# Patient Record
Sex: FEMALE | Race: OTHER | HISPANIC OR LATINO
[De-identification: names, ages, dates, MRNs, and addresses within clinical notes are randomized per-mention and may not be internally consistent; named-entity substitution may affect disease eponyms.]

---

## 2021-03-15 ENCOUNTER — APPOINTMENT (OUTPATIENT)
Dept: GASTROENTEROLOGY | Facility: CLINIC | Age: 76
End: 2021-03-15
Payer: MEDICARE

## 2021-03-15 VITALS
WEIGHT: 209 LBS | HEIGHT: 58 IN | DIASTOLIC BLOOD PRESSURE: 54 MMHG | RESPIRATION RATE: 16 BRPM | TEMPERATURE: 98.2 F | SYSTOLIC BLOOD PRESSURE: 185 MMHG | OXYGEN SATURATION: 95 % | BODY MASS INDEX: 43.87 KG/M2 | HEART RATE: 72 BPM

## 2021-03-15 DIAGNOSIS — I10 ESSENTIAL (PRIMARY) HYPERTENSION: ICD-10-CM

## 2021-03-15 DIAGNOSIS — K64.9 UNSPECIFIED HEMORRHOIDS: ICD-10-CM

## 2021-03-15 DIAGNOSIS — K62.89 OTHER SPECIFIED DISEASES OF ANUS AND RECTUM: ICD-10-CM

## 2021-03-15 DIAGNOSIS — Z80.0 FAMILY HISTORY OF MALIGNANT NEOPLASM OF DIGESTIVE ORGANS: ICD-10-CM

## 2021-03-15 DIAGNOSIS — K59.00 CONSTIPATION, UNSPECIFIED: ICD-10-CM

## 2021-03-15 DIAGNOSIS — K60.2 ANAL FISSURE, UNSPECIFIED: ICD-10-CM

## 2021-03-15 DIAGNOSIS — Z87.19 PERSONAL HISTORY OF OTHER DISEASES OF THE DIGESTIVE SYSTEM: ICD-10-CM

## 2021-03-15 PROCEDURE — 99215 OFFICE O/P EST HI 40 MIN: CPT

## 2021-03-15 PROCEDURE — 99072 ADDL SUPL MATRL&STAF TM PHE: CPT

## 2021-03-15 RX ORDER — OMEPRAZOLE 40 MG/1
40 CAPSULE, DELAYED RELEASE ORAL
Refills: 0 | Status: ACTIVE | COMMUNITY

## 2021-03-15 RX ORDER — ROSUVASTATIN CALCIUM 10 MG/1
10 TABLET, FILM COATED ORAL
Refills: 0 | Status: ACTIVE | COMMUNITY

## 2021-03-15 RX ORDER — LOSARTAN POTASSIUM AND HYDROCHLOROTHIAZIDE 12.5; 1 MG/1; MG/1
100-12.5 TABLET ORAL
Refills: 0 | Status: ACTIVE | COMMUNITY

## 2021-03-15 NOTE — ASSESSMENT
[FreeTextEntry1] : rectal pain- will retreat with topical treatment and diltiazem. However patient has failed medical therapy until now so will; get a surgical evaluation\par Constipation- PATIENTS EXTENSIVE MEDICAL RECORD REVIEWED INCLUDING RESULTS FRO HER PREVIOUS COLONOSCOPY, EGD AND FLEXIBLE SIGMOIDOSCOPY. She has a fecal impaction and so was given instructions for a cleansing prep. Dietary and lifestyle modification discussed with the patient.

## 2021-03-15 NOTE — PHYSICAL EXAM
[General Appearance - Alert] : alert [General Appearance - In No Acute Distress] : in no acute distress [Sclera] : the sclera and conjunctiva were normal [PERRL With Normal Accommodation] : pupils were equal in size, round, and reactive to light [Extraocular Movements] : extraocular movements were intact [Outer Ear] : the ears and nose were normal in appearance [Neck Appearance] : the appearance of the neck was normal [Neck Cervical Mass (___cm)] : no neck mass was observed [Jugular Venous Distention Increased] : there was no jugular-venous distention [Thyroid Diffuse Enlargement] : the thyroid was not enlarged [Thyroid Nodule] : there were no palpable thyroid nodules [Heart Rate And Rhythm] : heart rate was normal and rhythm regular [Heart Sounds] : normal S1 and S2 [Heart Sounds Gallop] : no gallops [Murmurs] : no murmurs [Heart Sounds Pericardial Friction Rub] : no pericardial rub [Bowel Sounds] : normal bowel sounds [Abdomen Soft] : soft [Abdomen Tenderness] : non-tender [] : no hepato-splenomegaly [Abdomen Mass (___ Cm)] : no abdominal mass palpated [Normal Sphincter Tone] : normal sphincter tone [No Rectal Mass] : no rectal mass [External Hemorrhoid] : external hemorrhoids [Cervical Lymph Nodes Enlarged Posterior Bilaterally] : posterior cervical [Cervical Lymph Nodes Enlarged Anterior Bilaterally] : anterior cervical [Supraclavicular Lymph Nodes Enlarged Bilaterally] : supraclavicular [Axillary Lymph Nodes Enlarged Bilaterally] : axillary [No CVA Tenderness] : no ~M costovertebral angle tenderness [No Spinal Tenderness] : no spinal tenderness [Abnormal Walk] : normal gait [Nail Clubbing] : no clubbing  or cyanosis of the fingernails [Musculoskeletal - Swelling] : no joint swelling seen [Motor Tone] : muscle strength and tone were normal [Deep Tendon Reflexes (DTR)] : deep tendon reflexes were 2+ and symmetric [Sensation] : the sensory exam was normal to light touch and pinprick [No Focal Deficits] : no focal deficits [Oriented To Time, Place, And Person] : oriented to person, place, and time [Impaired Insight] : insight and judgment were intact [Affect] : the affect was normal [Occult Blood Positive] : stool was negative for occult blood [FreeTextEntry1] : fecal impaction

## 2021-03-15 NOTE — HISTORY OF PRESENT ILLNESS
[Heartburn] : denies heartburn [Nausea] : denies nausea [Vomiting] : denies vomiting [Diarrhea] : denies diarrhea [Constipation] : constipation worsened [Yellow Skin Or Eyes (Jaundice)] : denies jaundice [Abdominal Pain] : denies abdominal pain [Abdominal Swelling] : denies abdominal swelling [Rectal Pain] : rectal pain worsened [Wt Gain ___ Lbs] : no recent weight gain [Wt Loss ___ Lbs] : no recent weight loss [GERD] : no gastroesophageal reflux disease [Hiatus Hernia] : no hiatus hernia [Peptic Ulcer Disease] : no peptic ulcer disease [Pancreatitis] : no pancreatitis [Cholelithiasis] : cholelithiasis [Kidney Stone] : no kidney stone [Inflammatory Bowel Disease] : no inflammatory bowel disease [Irritable Bowel Syndrome] : no irritable bowel syndrome [Diverticulitis] : diverticulitis [Alcohol Abuse] : no alcohol abuse [Malignancy] : no malignancy [Abdominal Surgery] : abdominal surgery [Appendectomy] : no appendectomy [Cholecystectomy] : cholecystectomy [de-identified] : 75 year old woman complains of rectal pain. She has a history of rectal pain for several years for which she has been receiving treatment from several gastroenterologists. She was told she has hemorrhoids and an anal fissure. She has had topical therapy including Diltiazem ointment that gave her some relief. She now complains of worsening constipation and increasing rectal pain. Her most recent colonoscopy in 2017 was negative. EGD in 1/2020 was negative. Flexible sigmoidoscopy on 6/26/20 showed internal hemorrhoids and an anal fissure. She denies rectal bleeding ,melena or hematemesis.

## 2021-07-30 ENCOUNTER — APPOINTMENT (OUTPATIENT)
Dept: OBGYN | Facility: CLINIC | Age: 76
End: 2021-07-30
Payer: MEDICARE

## 2021-07-30 VITALS — WEIGHT: 214 LBS | BODY MASS INDEX: 44.73 KG/M2

## 2021-07-30 DIAGNOSIS — Z01.419 ENCOUNTER FOR GYNECOLOGICAL EXAMINATION (GENERAL) (ROUTINE) W/OUT ABNORMAL FINDINGS: ICD-10-CM

## 2021-07-30 DIAGNOSIS — E66.01 MORBID (SEVERE) OBESITY DUE TO EXCESS CALORIES: ICD-10-CM

## 2021-07-30 PROCEDURE — 99387 INIT PM E/M NEW PAT 65+ YRS: CPT

## 2021-07-30 NOTE — PHYSICAL EXAM
[Examination Of The Breasts] : a normal appearance [No Masses] : no breast masses were palpable [Labia Majora] : normal [Labia Minora] : normal [Normal] : normal [Uterine Adnexae] : normal DISPLAY PLAN FREE TEXT

## 2021-07-30 NOTE — HISTORY OF PRESENT ILLNESS
[FreeTextEntry1] : here for annual\par complains of pelvic pain mostly when she stands up and on her hips \par menopausal / denies PMB \par mammo 2021 neg per patient \par obesity \par

## 2021-08-27 ENCOUNTER — APPOINTMENT (OUTPATIENT)
Dept: OBGYN | Facility: CLINIC | Age: 76
End: 2021-08-27
Payer: MEDICARE

## 2021-08-27 VITALS — DIASTOLIC BLOOD PRESSURE: 78 MMHG | BODY MASS INDEX: 44.1 KG/M2 | WEIGHT: 211 LBS | SYSTOLIC BLOOD PRESSURE: 146 MMHG

## 2021-08-27 DIAGNOSIS — M54.9 DORSALGIA, UNSPECIFIED: ICD-10-CM

## 2021-08-27 PROCEDURE — 99213 OFFICE O/P EST LOW 20 MIN: CPT

## 2021-08-27 NOTE — PLAN
[FreeTextEntry1] : because the patient denies any PMB no further testing needed \par rec to f/u with her PCP for her lower back and pelvic pain

## 2021-08-27 NOTE — HISTORY OF PRESENT ILLNESS
[FreeTextEntry1] : patient is here for results of her pelvic sonogram\par sono shows a <2cm fibroid and a ET of 16 mm \par patient denies any PMB and complains of pelvic pain \par

## 2022-07-29 ENCOUNTER — APPOINTMENT (OUTPATIENT)
Dept: OBGYN | Facility: CLINIC | Age: 77
End: 2022-07-29

## 2022-08-15 ENCOUNTER — APPOINTMENT (OUTPATIENT)
Dept: OBGYN | Facility: CLINIC | Age: 77
End: 2022-08-15

## 2022-08-15 VITALS
BODY MASS INDEX: 44.5 KG/M2 | WEIGHT: 212 LBS | DIASTOLIC BLOOD PRESSURE: 90 MMHG | HEIGHT: 58 IN | SYSTOLIC BLOOD PRESSURE: 180 MMHG

## 2022-08-15 DIAGNOSIS — Z86.39 PERSONAL HISTORY OF OTHER ENDOCRINE, NUTRITIONAL AND METABOLIC DISEASE: ICD-10-CM

## 2022-08-15 DIAGNOSIS — R10.2 PELVIC AND PERINEAL PAIN: ICD-10-CM

## 2022-08-15 DIAGNOSIS — Z87.19 PERSONAL HISTORY OF OTHER DISEASES OF THE DIGESTIVE SYSTEM: ICD-10-CM

## 2022-08-15 DIAGNOSIS — Z78.9 OTHER SPECIFIED HEALTH STATUS: ICD-10-CM

## 2022-08-15 PROCEDURE — 99204 OFFICE O/P NEW MOD 45 MIN: CPT | Mod: 25

## 2022-08-15 PROCEDURE — 58100 BIOPSY OF UTERUS LINING: CPT

## 2022-08-15 PROCEDURE — 99214 OFFICE O/P EST MOD 30 MIN: CPT | Mod: 25

## 2022-08-15 NOTE — PROCEDURE
[Consent Obtained] : Consent obtained [Thickened Endometrium] : thickened endometrium [Betadine] : Betadine [Tenaculum] : Tenaculum [Easy Passage] : Easy passage [Moderate] : moderate [Tolerated Well] : Patient tolerated the procedure well [No Complications] : No complications

## 2022-08-22 PROBLEM — Z86.39 HISTORY OF DIABETES MELLITUS: Status: RESOLVED | Noted: 2022-08-15 | Resolved: 2022-08-22

## 2022-08-22 PROBLEM — Z78.9 SOCIAL ALCOHOL USE: Status: ACTIVE | Noted: 2022-08-15

## 2022-08-22 PROBLEM — Z87.19 HISTORY OF GASTROESOPHAGEAL REFLUX (GERD): Status: RESOLVED | Noted: 2022-08-15 | Resolved: 2022-08-22

## 2022-09-01 LAB — CYTOLOGY CVX/VAG DOC THIN PREP: ABNORMAL

## 2022-10-26 ENCOUNTER — NON-APPOINTMENT (OUTPATIENT)
Age: 77
End: 2022-10-26

## 2022-11-08 ENCOUNTER — APPOINTMENT (OUTPATIENT)
Dept: NEPHROLOGY | Facility: CLINIC | Age: 77
End: 2022-11-08

## 2023-05-01 ENCOUNTER — APPOINTMENT (OUTPATIENT)
Dept: OBGYN | Facility: CLINIC | Age: 78
End: 2023-05-01
Payer: MEDICARE

## 2023-05-01 DIAGNOSIS — R93.89 ABNORMAL FINDINGS ON DIAGNOSTIC IMAGING OF OTHER SPECIFIED BODY STRUCTURES: ICD-10-CM

## 2023-05-01 DIAGNOSIS — N84.0 POLYP OF CORPUS UTERI: ICD-10-CM

## 2023-05-01 PROCEDURE — 99213 OFFICE O/P EST LOW 20 MIN: CPT

## 2023-05-01 RX ORDER — HYDROCORTISONE ACETATE 25 MG/1
25 SUPPOSITORY RECTAL TWICE DAILY
Qty: 1 | Refills: 1 | Status: DISCONTINUED | COMMUNITY
Start: 2021-03-15 | End: 2023-05-01

## 2023-05-01 RX ORDER — ENEMA 19; 7 G/133ML; G/133ML
7-19 ENEMA RECTAL
Qty: 1 | Refills: 1 | Status: DISCONTINUED | OUTPATIENT
Start: 2021-03-15 | End: 2023-05-01

## 2023-05-01 RX ORDER — POLYETHYLENE GLYCOL 3350 17 G/17G
17 POWDER, FOR SOLUTION ORAL DAILY
Qty: 1 | Refills: 0 | Status: DISCONTINUED | OUTPATIENT
Start: 2021-03-15 | End: 2023-05-01

## 2023-09-13 ENCOUNTER — INPATIENT (INPATIENT)
Facility: HOSPITAL | Age: 78
LOS: 7 days | Discharge: HOME | DRG: 287 | End: 2023-09-21
Attending: INTERNAL MEDICINE | Admitting: INTERNAL MEDICINE
Payer: MEDICARE

## 2023-09-13 VITALS
OXYGEN SATURATION: 99 % | HEART RATE: 76 BPM | RESPIRATION RATE: 20 BRPM | DIASTOLIC BLOOD PRESSURE: 76 MMHG | SYSTOLIC BLOOD PRESSURE: 190 MMHG | TEMPERATURE: 98 F

## 2023-09-13 DIAGNOSIS — Z90.49 ACQUIRED ABSENCE OF OTHER SPECIFIED PARTS OF DIGESTIVE TRACT: Chronic | ICD-10-CM

## 2023-09-13 DIAGNOSIS — R07.9 CHEST PAIN, UNSPECIFIED: ICD-10-CM

## 2023-09-13 LAB
ALBUMIN SERPL ELPH-MCNC: 3.4 G/DL — SIGNIFICANT CHANGE UP (ref 3.3–5)
ALP SERPL-CCNC: 184 U/L — HIGH (ref 40–120)
ALT FLD-CCNC: 20 U/L — SIGNIFICANT CHANGE UP (ref 10–45)
ANION GAP SERPL CALC-SCNC: 11 MMOL/L — SIGNIFICANT CHANGE UP (ref 5–17)
AST SERPL-CCNC: 45 U/L — HIGH (ref 10–40)
BASOPHILS # BLD AUTO: 0.05 K/UL — SIGNIFICANT CHANGE UP (ref 0–0.2)
BASOPHILS NFR BLD AUTO: 0.9 % — SIGNIFICANT CHANGE UP (ref 0–2)
BILIRUB SERPL-MCNC: 0.5 MG/DL — SIGNIFICANT CHANGE UP (ref 0.2–1.2)
BUN SERPL-MCNC: 12 MG/DL — SIGNIFICANT CHANGE UP (ref 7–23)
CALCIUM SERPL-MCNC: 9.6 MG/DL — SIGNIFICANT CHANGE UP (ref 8.4–10.5)
CHLORIDE SERPL-SCNC: 107 MMOL/L — SIGNIFICANT CHANGE UP (ref 96–108)
CO2 SERPL-SCNC: 24 MMOL/L — SIGNIFICANT CHANGE UP (ref 22–31)
CREAT SERPL-MCNC: 1.04 MG/DL — SIGNIFICANT CHANGE UP (ref 0.5–1.3)
EGFR: 55 ML/MIN/1.73M2 — LOW
EOSINOPHIL # BLD AUTO: 0.18 K/UL — SIGNIFICANT CHANGE UP (ref 0–0.5)
EOSINOPHIL NFR BLD AUTO: 3.2 % — SIGNIFICANT CHANGE UP (ref 0–6)
GLUCOSE SERPL-MCNC: 98 MG/DL — SIGNIFICANT CHANGE UP (ref 70–99)
HCT VFR BLD CALC: 27.4 % — LOW (ref 34.5–45)
HGB BLD-MCNC: 8.1 G/DL — LOW (ref 11.5–15.5)
IMM GRANULOCYTES NFR BLD AUTO: 0.2 % — SIGNIFICANT CHANGE UP (ref 0–0.9)
LYMPHOCYTES # BLD AUTO: 2.45 K/UL — SIGNIFICANT CHANGE UP (ref 1–3.3)
LYMPHOCYTES # BLD AUTO: 43.8 % — SIGNIFICANT CHANGE UP (ref 13–44)
MCHC RBC-ENTMCNC: 24.9 PG — LOW (ref 27–34)
MCHC RBC-ENTMCNC: 29.6 GM/DL — LOW (ref 32–36)
MCV RBC AUTO: 84.3 FL — SIGNIFICANT CHANGE UP (ref 80–100)
MONOCYTES # BLD AUTO: 0.51 K/UL — SIGNIFICANT CHANGE UP (ref 0–0.9)
MONOCYTES NFR BLD AUTO: 9.1 % — SIGNIFICANT CHANGE UP (ref 2–14)
NEUTROPHILS # BLD AUTO: 2.39 K/UL — SIGNIFICANT CHANGE UP (ref 1.8–7.4)
NEUTROPHILS NFR BLD AUTO: 42.8 % — LOW (ref 43–77)
NRBC # BLD: 0 /100 WBCS — SIGNIFICANT CHANGE UP (ref 0–0)
NT-PROBNP SERPL-SCNC: 329 PG/ML — HIGH (ref 0–300)
PLATELET # BLD AUTO: 189 K/UL — SIGNIFICANT CHANGE UP (ref 150–400)
POTASSIUM SERPL-MCNC: 3.8 MMOL/L — SIGNIFICANT CHANGE UP (ref 3.5–5.3)
POTASSIUM SERPL-SCNC: 3.8 MMOL/L — SIGNIFICANT CHANGE UP (ref 3.5–5.3)
PROT SERPL-MCNC: 7.2 G/DL — SIGNIFICANT CHANGE UP (ref 6–8.3)
RBC # BLD: 3.25 M/UL — LOW (ref 3.8–5.2)
RBC # FLD: 17.5 % — HIGH (ref 10.3–14.5)
SODIUM SERPL-SCNC: 142 MMOL/L — SIGNIFICANT CHANGE UP (ref 135–145)
TROPONIN T, HIGH SENSITIVITY RESULT: 18 NG/L — SIGNIFICANT CHANGE UP (ref 0–51)
WBC # BLD: 5.59 K/UL — SIGNIFICANT CHANGE UP (ref 3.8–10.5)
WBC # FLD AUTO: 5.59 K/UL — SIGNIFICANT CHANGE UP (ref 3.8–10.5)

## 2023-09-13 PROCEDURE — 99285 EMERGENCY DEPT VISIT HI MDM: CPT

## 2023-09-13 PROCEDURE — 71045 X-RAY EXAM CHEST 1 VIEW: CPT | Mod: 26

## 2023-09-13 NOTE — ED PROVIDER NOTE - ATTENDING CONTRIBUTION TO CARE
Attending MD Fofana: I personally have seen and examined this patient.  Resident note reviewed and agree on plan of care and except where noted.  See below for details.     seen in [facility] 1    78F with PMH/PSH including HTN, HLD, DM, arthritis (Naproxen PRN), GERD presents to the ED with chest pressure for a week.  Reports pain started one day about a week, denies inciting event.  Reports shortness of breath with exertion this week.  Reports chest pressure is mid to L sided.  Reports has had LE edema all summer, denies worse this week.  Reports saw her doctor this AM who sent her in for eval. Denies abdominal pain, nausea, vomiting, diarrhea, bloody or black stools. Denies dysuria, hematuria, change in urinary habits including frequency, urgency. Denies recent illness, fevers, chills.  Denies URI symptoms.  Denies unilateral leg swelling, hemoptysis, recent surgery or trauma, prior PE or DVT, hormone use, history of malignancy. Reports takes Naproxen for hip pain but reports that did not improve her chest pain.  Reports also takes ASA 81mg.  Reports was given ASA x 4 at [facility] Dr. Pryor's office.    Exam:   General: NAD  HENT: head NCAT, airway patent  Eyes: anicteric, no conjunctival injection   Lungs: lungs CTAB with good inspiratory effort, no wheezing, no rhonchi, no rales  Cardiac: +S1S2, no obvious m/r/g, +pitting edema bilaterally to knees  GI: abdomen soft with +BS, NT, ND  : no CVAT  MSK: ranging neck and extremities freely  Neuro: moving all extremities spontaneously, nonfocal  Psych: normal mood and affect    A/P: 78F with chest pressure, shortness of breath, will evaluate for but not limited to ACS, will eval for new HF, will obtain labs, EKG, keep on monitor, CXR, likely admit Attending MD Fofana: I personally have seen and examined this patient.  Resident note reviewed and agree on plan of care and except where noted.  See below for details.     seen in [facility] 1    78F with PMH/PSH including HTN, HLD, DM, arthritis (Naproxen PRN), GERD presents to the ED with chest pressure for a week.  Reports pain started one day about a week, denies inciting event.  Reports shortness of breath with exertion this week.  Reports chest pressure is mid to L sided.  Reports has had LE edema all summer, denies worse this week.  Reports saw her doctor this AM who sent her in for eval. Denies abdominal pain, nausea, vomiting, diarrhea, bloody or black stools. Denies dysuria, hematuria, change in urinary habits including frequency, urgency. Denies recent illness, fevers, chills.  Denies URI symptoms.  Denies unilateral leg swelling, hemoptysis, recent surgery or trauma, prior PE or DVT, hormone use, history of malignancy. Reports takes Naproxen for hip pain but reports that did not improve her chest pain.  Reports also takes ASA 81mg.  Reports was given ASA x 4 at [facility] Dr. Pryor's office.    Exam:   General: NAD  HENT: head NCAT, airway patent  Eyes: anicteric, no conjunctival injection   Lungs: lungs CTAB with good inspiratory effort, no wheezing, no rhonchi, no rales  Cardiac: +S1S2, no obvious m/r/g, +pitting edema bilaterally to knees  GI: abdomen soft with +BS, NT, ND  : no CVAT  MSK: ranging neck and extremities freely  Neuro: moving all extremities spontaneously, nonfocal  Psych: normal mood and affect    A/P: 78F with chest pressure, shortness of breath, will evaluate for but not limited to ACS, will eval for new HF, will obtain labs, EKG, keep on monitor, CXR, likely admit Attending MD Fofana: I personally have seen and examined this patient.  Resident note reviewed and agree on plan of care and except where noted.  See below for details.     seen in [facility] 1    78F with PMH/PSH including HTN, HLD, DM, arthritis (Naproxen PRN), GERD presents to the ED with chest pressure for a week.  Reports pain started one day about a week, denies inciting event.  Reports shortness of breath with exertion this week.  Reports chest pressure is mid to L sided.  Reports has had LE edema all summer, denies worse this week.  Reports saw her doctor this AM who sent her in for eval. Denies abdominal pain, nausea, vomiting, diarrhea, bloody or black stools. Denies dysuria, hematuria, change in urinary habits including frequency, urgency. Denies recent illness, fevers, chills.  Denies URI symptoms.  Denies unilateral leg swelling, hemoptysis, recent surgery or trauma, prior PE or DVT, hormone use, history of malignancy. Reports takes Naproxen for hip pain but reports that did not improve her chest pain.  Reports also takes ASA 81mg.  Reports was given ASA x 4 at [facility] Dr. Pryor's office.    Exam:   General: NAD  HENT: head NCAT, airway patent  Eyes: anicteric, no conjunctival injection   Lungs: lungs CTAB with good inspiratory effort, no wheezing, no rhonchi, no rales  Cardiac: +S1S2, no obvious m/r/g, +pitting edema bilaterally to knees  GI: abdomen soft with +BS, NT, ND  : no CVAT  MSK: ranging neck and extremities freely  Neuro: moving all extremities spontaneously, nonfocal  Psych: normal mood and affect    A/P: 78F with chest pressure, shortness of breath, will evaluate for but not limited to ACS, will eval for new HF, will obtain labs, EKG, keep on monitor, CXR, likely admit

## 2023-09-13 NOTE — ED PROVIDER NOTE - PROGRESS NOTE DETAILS
Matt Beltran MD PGY1: Patient reassessed, stable. Offered analgesia. Deferring at this time. Got ASA x 4 prior at cardiologist.

## 2023-09-13 NOTE — ED ADULT TRIAGE NOTE - NS ED NURSE AMBULANCES
[facility], Ambulance Department [facility], Ambulance Department [facility], Ambulance Department

## 2023-09-13 NOTE — ED ADULT NURSE NOTE - NSFALLRISKINTERV_ED_ALL_ED
Communicate fall risk and risk factors to all staff, patient, and family/Provide visual cue: yellow wristband, yellow gown, etc/Reinforce activity limits and safety measures with patient and family/Call bell, personal items and telephone in reach/Instruct patient to call for assistance before getting out of bed/chair/stretcher/Non-slip footwear applied when patient is off stretcher/[place] to call system/Physically safe environment - no spills, clutter or unnecessary equipment/Purposeful Proactive Rounding/Room/bathroom lighting operational, light cord in reach Communicate fall risk and risk factors to all staff, patient, and family/Provide visual cue: yellow wristband, yellow gown, etc/Reinforce activity limits and safety measures with patient and family/Call bell, personal items and telephone in reach/Instruct patient to call for assistance before getting out of bed/chair/stretcher/Non-slip footwear applied when patient is off stretcher/[place] to call system/Physically safe environment - no spills, clutter or unnecessary equipment/Purposeful Proactive Rounding/Room/bathroom lighting operational, light cord in reach Communicate fall risk and risk factors to all staff, patient, and family/Provide visual cue: yellow wristband, yellow gown, etc/Reinforce activity limits and safety measures with patient and family/Call bell, personal items and telephone in reach/Instruct patient to call for assistance before getting out of bed/chair/stretcher/Non-slip footwear applied when patient is off stretcher/[place] to call system/Physically safe environment - no spills, clutter or unnecessary equipment/Purposeful Proactive Rounding/Room/bathroom lighting operational, light cord in reach

## 2023-09-13 NOTE — ED PROVIDER NOTE - CLINICAL SUMMARY MEDICAL DECISION MAKING FREE TEXT BOX
70-year-old female with past medical history of hypertension, diabetes, HLD presenting to the ED with 2 to 3 days of worsening substernal chest pain nonradiating with shortness of breath.  Patient concurrently endorsing progressive exertional dyspnea over the last few weeks tolerating only 10 to 15 feet before becoming winded.  Worsening bilateral peripheral edema of LE.  No vision changes or headache decreased urinary output concerning for hypertensive emergency.  Sent by cardiologist Dr. Beny Pryor given chest pain and hypertension concerning for ACS.  Low suspicion for acute aortic dissection or PE.  Neuro exam nonfocal, intact.  2+ bilateral pitting edema pitting.  Dispo pending labs and imaging, likely admission for CDU for stress/echo, possible cath.

## 2023-09-13 NOTE — ED PROVIDER NOTE - NSICDXPASTMEDICALHX_GEN_ALL_CORE_FT
PAST MEDICAL HISTORY:  DM (diabetes mellitus)     History of diverticulitis     HLD (hyperlipidemia)     HTN (hypertension)     Osteoarthritis

## 2023-09-13 NOTE — ED PROVIDER NOTE - OBJECTIVE STATEMENT
78 yof Hx of HTN on losartan, DM on metformin, HLD presenting with 2-3 days of substernal chest pain non-radiating with shortness of breath. Past few weeks has had worsening progressive exertional dyspnea, only able to tolerate 10-15 feet before becoming winded. No orthopnea. Endorsing worsening peripheral edema pitting b/l in same time frame. No vision changes or headache. No decreased urinary output or hematuria. Seen by PCP this morning who was concerned about her chest pain, blood pressure and thyroid tests? Was sent to cardiology Dr. Beny Garber who sent to ED for likely stress/echo and possible cath. No abd pain, NVD. No diaphoresis. No fevers/chills. No hemoptysis. Pain not radiating to back.     Hx per daughter in law at bedside, offered  deferred preferring to translate for provider. Affirmed at any point can utilize .

## 2023-09-13 NOTE — ED ADULT NURSE NOTE - OBJECTIVE STATEMENT
77 y/o female presents to the ED BIBEMS sent by cardiologist for unstable angina. A/Ox4. Ambulatory without assistive devices at baseline. PMH: HTN, HLD and CAD. Macedonian-speaking,  used. Patient sent by MD Pryor for "r/o acute MI, CXR and d-dimer". Patient endorses 6/10 midsternal chest pain beginning 6 days ago. As per EMS, patient was given PO nitro and 324mg of ASA at 19:30. Patient endorses relief of chest pain following nitro. Patient denies chest pain at this time, dyspnea, lightheadedness, nausea and vomiting. Patient on CM, NSR. Safety and comfort provided. 79 y/o female presents to the ED BIBEMS sent by cardiologist for unstable angina. A/Ox4. Ambulatory without assistive devices at baseline. PMH: HTN, HLD and CAD. Colombian-speaking,  used. Patient sent by MD Pryor for "r/o acute MI, CXR and d-dimer". Patient endorses 6/10 midsternal chest pain beginning 6 days ago. As per EMS, patient was given PO nitro and 324mg of ASA at 19:30. Patient endorses relief of chest pain following nitro. Patient denies chest pain at this time, dyspnea, lightheadedness, nausea and vomiting. Patient on CM, NSR. Safety and comfort provided. 79 y/o female presents to the ED BIBEMS sent by cardiologist for unstable angina. A/Ox4. Ambulatory without assistive devices at baseline. PMH: HTN, HLD and CAD. Tajik-speaking,  used. Patient sent by MD Pryor for "r/o acute MI, CXR and d-dimer". Patient endorses 6/10 midsternal chest pain beginning 6 days ago. As per EMS, patient was given PO nitro and 324mg of ASA at 19:30. Patient endorses relief of chest pain following nitro. Patient denies chest pain at this time, dyspnea, lightheadedness, nausea and vomiting. Patient on CM, NSR. Safety and comfort provided.

## 2023-09-13 NOTE — ED PROVIDER NOTE - PHYSICAL EXAMINATION
GEN: Patient awake and alert. No acute distress. Well appearing.  Head: normocephalic, atraumatic.  Neck: Nontender, full ROM. No LAD.  Eyes: PERRLA b/l. EOMI, no scleral icterus, no conjunctival injection. Moist mucous membranes.  CARDIAC: RRR, hypertensive to 200 systolic. Normal S1, S2. No murmur, rubs, or gallops. B/l 2+ pitting edema up to knees.   PULM: CTA B/L no wheeze, rales or rhonchi. No signs of respiratory distress, no accessory muscle usage or nasal flaring.  ABD: Soft, nontender, nondistended. No rebound, no involuntary guarding. BS x 4, no auscultated bruit. No HSM appreciated.  : No CVA tenderness, no suprapubic tenderness.  MSK: Moving all extremities. 5/5 strength and full ROM in all extremities. No obvious deformity.  NEURO: A&Ox3, no focal neurological deficits, CN 2-12 grossly intact. FTN and HTS coordination intact.   SKIN: warm, dry, no rash, no lesions, no open wounds. No urticaria.

## 2023-09-14 DIAGNOSIS — N17.9 ACUTE KIDNEY FAILURE, UNSPECIFIED: ICD-10-CM

## 2023-09-14 DIAGNOSIS — I10 ESSENTIAL (PRIMARY) HYPERTENSION: ICD-10-CM

## 2023-09-14 DIAGNOSIS — E78.5 HYPERLIPIDEMIA, UNSPECIFIED: ICD-10-CM

## 2023-09-14 DIAGNOSIS — E11.9 TYPE 2 DIABETES MELLITUS WITHOUT COMPLICATIONS: ICD-10-CM

## 2023-09-14 DIAGNOSIS — Z29.9 ENCOUNTER FOR PROPHYLACTIC MEASURES, UNSPECIFIED: ICD-10-CM

## 2023-09-14 DIAGNOSIS — I20.9 ANGINA PECTORIS, UNSPECIFIED: ICD-10-CM

## 2023-09-14 DIAGNOSIS — E66.01 MORBID (SEVERE) OBESITY DUE TO EXCESS CALORIES: ICD-10-CM

## 2023-09-14 LAB
ALBUMIN SERPL ELPH-MCNC: 3.2 G/DL — LOW (ref 3.3–5)
ALP SERPL-CCNC: 185 U/L — HIGH (ref 40–120)
ALT FLD-CCNC: 21 U/L — SIGNIFICANT CHANGE UP (ref 10–45)
ANION GAP SERPL CALC-SCNC: 10 MMOL/L — SIGNIFICANT CHANGE UP (ref 5–17)
ANION GAP SERPL CALC-SCNC: 13 MMOL/L — SIGNIFICANT CHANGE UP (ref 5–17)
AST SERPL-CCNC: 44 U/L — HIGH (ref 10–40)
BILIRUB SERPL-MCNC: 0.6 MG/DL — SIGNIFICANT CHANGE UP (ref 0.2–1.2)
BUN SERPL-MCNC: 14 MG/DL — SIGNIFICANT CHANGE UP (ref 7–23)
CALCIUM SERPL-MCNC: 9.4 MG/DL — SIGNIFICANT CHANGE UP (ref 8.4–10.5)
CALCIUM SERPL-MCNC: 9.5 MG/DL — SIGNIFICANT CHANGE UP (ref 8.4–10.5)
CHLORIDE SERPL-SCNC: 105 MMOL/L — SIGNIFICANT CHANGE UP (ref 96–108)
CHLORIDE SERPL-SCNC: 106 MMOL/L — SIGNIFICANT CHANGE UP (ref 96–108)
CHOLEST SERPL-MCNC: 178 MG/DL — SIGNIFICANT CHANGE UP
CK MB BLD-MCNC: 1.7 % — SIGNIFICANT CHANGE UP (ref 0–3.5)
CK MB CFR SERPL CALC: 2.9 NG/ML — SIGNIFICANT CHANGE UP (ref 0–3.8)
CK SERPL-CCNC: 166 U/L — SIGNIFICANT CHANGE UP (ref 25–170)
CO2 SERPL-SCNC: 21 MMOL/L — LOW (ref 22–31)
CO2 SERPL-SCNC: 22 MMOL/L — SIGNIFICANT CHANGE UP (ref 22–31)
CREAT SERPL-MCNC: 1.15 MG/DL — SIGNIFICANT CHANGE UP (ref 0.5–1.3)
CREAT SERPL-MCNC: 1.17 MG/DL — SIGNIFICANT CHANGE UP (ref 0.5–1.3)
D DIMER BLD IA.RAPID-MCNC: 837 NG/ML DDU — HIGH
EGFR: 48 ML/MIN/1.73M2 — LOW
EGFR: 49 ML/MIN/1.73M2 — LOW
GLUCOSE BLDC GLUCOMTR-MCNC: 105 MG/DL — HIGH (ref 70–99)
GLUCOSE BLDC GLUCOMTR-MCNC: 117 MG/DL — HIGH (ref 70–99)
GLUCOSE BLDC GLUCOMTR-MCNC: 138 MG/DL — HIGH (ref 70–99)
GLUCOSE BLDC GLUCOMTR-MCNC: 161 MG/DL — HIGH (ref 70–99)
GLUCOSE SERPL-MCNC: 108 MG/DL — HIGH (ref 70–99)
GLUCOSE SERPL-MCNC: 110 MG/DL — HIGH (ref 70–99)
HCT VFR BLD CALC: 26.4 % — LOW (ref 34.5–45)
HCT VFR BLD CALC: 27.3 % — LOW (ref 34.5–45)
HDLC SERPL-MCNC: 78 MG/DL — SIGNIFICANT CHANGE UP
HGB BLD-MCNC: 7.8 G/DL — LOW (ref 11.5–15.5)
HGB BLD-MCNC: 8.1 G/DL — LOW (ref 11.5–15.5)
INR BLD: 1.06 RATIO — SIGNIFICANT CHANGE UP (ref 0.85–1.18)
LIPID PNL WITH DIRECT LDL SERPL: 84 MG/DL — SIGNIFICANT CHANGE UP
MCHC RBC-ENTMCNC: 24.7 PG — LOW (ref 27–34)
MCHC RBC-ENTMCNC: 24.8 PG — LOW (ref 27–34)
MCHC RBC-ENTMCNC: 29.5 GM/DL — LOW (ref 32–36)
MCHC RBC-ENTMCNC: 29.7 GM/DL — LOW (ref 32–36)
MCV RBC AUTO: 83.5 FL — SIGNIFICANT CHANGE UP (ref 80–100)
MCV RBC AUTO: 83.7 FL — SIGNIFICANT CHANGE UP (ref 80–100)
NON HDL CHOLESTEROL: 100 MG/DL — SIGNIFICANT CHANGE UP
NRBC # BLD: 0 /100 WBCS — SIGNIFICANT CHANGE UP (ref 0–0)
PLATELET # BLD AUTO: 185 K/UL — SIGNIFICANT CHANGE UP (ref 150–400)
POTASSIUM SERPL-MCNC: 3.7 MMOL/L — SIGNIFICANT CHANGE UP (ref 3.5–5.3)
POTASSIUM SERPL-MCNC: 3.8 MMOL/L — SIGNIFICANT CHANGE UP (ref 3.5–5.3)
POTASSIUM SERPL-SCNC: 3.7 MMOL/L — SIGNIFICANT CHANGE UP (ref 3.5–5.3)
POTASSIUM SERPL-SCNC: 3.8 MMOL/L — SIGNIFICANT CHANGE UP (ref 3.5–5.3)
PROT SERPL-MCNC: 7.3 G/DL — SIGNIFICANT CHANGE UP (ref 6–8.3)
PROTHROM AB SERPL-ACNC: 11.6 SEC — SIGNIFICANT CHANGE UP (ref 9.5–13)
RBC # BLD: 3.16 M/UL — LOW (ref 3.8–5.2)
RBC # BLD: 3.26 M/UL — LOW (ref 3.8–5.2)
RBC # FLD: 17.4 % — HIGH (ref 10.3–14.5)
RBC # FLD: 17.5 % — HIGH (ref 10.3–14.5)
SODIUM SERPL-SCNC: 138 MMOL/L — SIGNIFICANT CHANGE UP (ref 135–145)
SODIUM SERPL-SCNC: 139 MMOL/L — SIGNIFICANT CHANGE UP (ref 135–145)
TRIGL SERPL-MCNC: 88 MG/DL — SIGNIFICANT CHANGE UP
TROPONIN T, HIGH SENSITIVITY RESULT: 20 NG/L — SIGNIFICANT CHANGE UP (ref 0–51)
TSH SERPL-MCNC: 3.11 UIU/ML — SIGNIFICANT CHANGE UP (ref 0.27–4.2)
TSH SERPL-MCNC: 3.67 UIU/ML — SIGNIFICANT CHANGE UP (ref 0.27–4.2)
WBC # BLD: 4.71 K/UL — SIGNIFICANT CHANGE UP (ref 3.8–10.5)
WBC # BLD: 4.87 K/UL — SIGNIFICANT CHANGE UP (ref 3.8–10.5)
WBC # FLD AUTO: 4.71 K/UL — SIGNIFICANT CHANGE UP (ref 3.8–10.5)
WBC # FLD AUTO: 4.87 K/UL — SIGNIFICANT CHANGE UP (ref 3.8–10.5)

## 2023-09-14 PROCEDURE — 93306 TTE W/DOPPLER COMPLETE: CPT | Mod: 26

## 2023-09-14 PROCEDURE — 99223 1ST HOSP IP/OBS HIGH 75: CPT

## 2023-09-14 RX ORDER — SODIUM CHLORIDE 9 MG/ML
1000 INJECTION, SOLUTION INTRAVENOUS
Refills: 0 | Status: DISCONTINUED | OUTPATIENT
Start: 2023-09-14 | End: 2023-09-21

## 2023-09-14 RX ORDER — DEXTROSE 50 % IN WATER 50 %
12.5 SYRINGE (ML) INTRAVENOUS ONCE
Refills: 0 | Status: DISCONTINUED | OUTPATIENT
Start: 2023-09-14 | End: 2023-09-21

## 2023-09-14 RX ORDER — PANTOPRAZOLE SODIUM 20 MG/1
40 TABLET, DELAYED RELEASE ORAL
Refills: 0 | Status: DISCONTINUED | OUTPATIENT
Start: 2023-09-14 | End: 2023-09-21

## 2023-09-14 RX ORDER — DEXTROSE 50 % IN WATER 50 %
25 SYRINGE (ML) INTRAVENOUS ONCE
Refills: 0 | Status: DISCONTINUED | OUTPATIENT
Start: 2023-09-14 | End: 2023-09-21

## 2023-09-14 RX ORDER — GLUCAGON INJECTION, SOLUTION 0.5 MG/.1ML
1 INJECTION, SOLUTION SUBCUTANEOUS ONCE
Refills: 0 | Status: DISCONTINUED | OUTPATIENT
Start: 2023-09-14 | End: 2023-09-21

## 2023-09-14 RX ORDER — HEPARIN SODIUM 5000 [USP'U]/ML
5000 INJECTION INTRAVENOUS; SUBCUTANEOUS EVERY 12 HOURS
Refills: 0 | Status: DISCONTINUED | OUTPATIENT
Start: 2023-09-14 | End: 2023-09-21

## 2023-09-14 RX ORDER — ACETAMINOPHEN 500 MG
1000 TABLET ORAL ONCE
Refills: 0 | Status: COMPLETED | OUTPATIENT
Start: 2023-09-14 | End: 2023-09-14

## 2023-09-14 RX ORDER — LOSARTAN POTASSIUM 100 MG/1
100 TABLET, FILM COATED ORAL DAILY
Refills: 0 | Status: DISCONTINUED | OUTPATIENT
Start: 2023-09-14 | End: 2023-09-21

## 2023-09-14 RX ORDER — DEXTROSE 50 % IN WATER 50 %
15 SYRINGE (ML) INTRAVENOUS ONCE
Refills: 0 | Status: DISCONTINUED | OUTPATIENT
Start: 2023-09-14 | End: 2023-09-21

## 2023-09-14 RX ORDER — INSULIN LISPRO 100/ML
VIAL (ML) SUBCUTANEOUS
Refills: 0 | Status: DISCONTINUED | OUTPATIENT
Start: 2023-09-14 | End: 2023-09-21

## 2023-09-14 RX ORDER — LANOLIN ALCOHOL/MO/W.PET/CERES
3 CREAM (GRAM) TOPICAL AT BEDTIME
Refills: 0 | Status: DISCONTINUED | OUTPATIENT
Start: 2023-09-14 | End: 2023-09-21

## 2023-09-14 RX ORDER — ASPIRIN/CALCIUM CARB/MAGNESIUM 324 MG
81 TABLET ORAL DAILY
Refills: 0 | Status: DISCONTINUED | OUTPATIENT
Start: 2023-09-14 | End: 2023-09-21

## 2023-09-14 RX ORDER — ATORVASTATIN CALCIUM 80 MG/1
80 TABLET, FILM COATED ORAL AT BEDTIME
Refills: 0 | Status: DISCONTINUED | OUTPATIENT
Start: 2023-09-14 | End: 2023-09-21

## 2023-09-14 RX ORDER — INSULIN LISPRO 100/ML
VIAL (ML) SUBCUTANEOUS AT BEDTIME
Refills: 0 | Status: DISCONTINUED | OUTPATIENT
Start: 2023-09-14 | End: 2023-09-21

## 2023-09-14 RX ORDER — ACETAMINOPHEN 500 MG
650 TABLET ORAL EVERY 6 HOURS
Refills: 0 | Status: DISCONTINUED | OUTPATIENT
Start: 2023-09-14 | End: 2023-09-21

## 2023-09-14 RX ORDER — INFLUENZA VIRUS VACCINE 15; 15; 15; 15 UG/.5ML; UG/.5ML; UG/.5ML; UG/.5ML
0.7 SUSPENSION INTRAMUSCULAR ONCE
Refills: 0 | Status: DISCONTINUED | OUTPATIENT
Start: 2023-09-14 | End: 2023-09-21

## 2023-09-14 RX ORDER — ONDANSETRON 8 MG/1
4 TABLET, FILM COATED ORAL EVERY 8 HOURS
Refills: 0 | Status: DISCONTINUED | OUTPATIENT
Start: 2023-09-14 | End: 2023-09-21

## 2023-09-14 RX ORDER — SENNA PLUS 8.6 MG/1
2 TABLET ORAL AT BEDTIME
Refills: 0 | Status: DISCONTINUED | OUTPATIENT
Start: 2023-09-14 | End: 2023-09-21

## 2023-09-14 RX ADMIN — HEPARIN SODIUM 5000 UNIT(S): 5000 INJECTION INTRAVENOUS; SUBCUTANEOUS at 17:14

## 2023-09-14 RX ADMIN — LOSARTAN POTASSIUM 100 MILLIGRAM(S): 100 TABLET, FILM COATED ORAL at 05:39

## 2023-09-14 RX ADMIN — HEPARIN SODIUM 5000 UNIT(S): 5000 INJECTION INTRAVENOUS; SUBCUTANEOUS at 05:39

## 2023-09-14 RX ADMIN — Medication 400 MILLIGRAM(S): at 01:42

## 2023-09-14 RX ADMIN — Medication 3 MILLIGRAM(S): at 21:27

## 2023-09-14 RX ADMIN — ATORVASTATIN CALCIUM 80 MILLIGRAM(S): 80 TABLET, FILM COATED ORAL at 21:27

## 2023-09-14 RX ADMIN — Medication 1000 MILLIGRAM(S): at 05:31

## 2023-09-14 RX ADMIN — Medication 81 MILLIGRAM(S): at 11:26

## 2023-09-14 NOTE — H&P ADULT - ASSESSMENT
78y F pmh htn, hld, dm, p/w  2-3 days of substernal non- radiating CP, SOB, and progressive CHEATHAM x past few weeks admitted for CP w/u

## 2023-09-14 NOTE — ED ADULT NURSE REASSESSMENT NOTE - NS ED NURSE REASSESS COMMENT FT1
Received report from DEWAYNE Alexander. Pt is awake and alert, resting comfortably in stretcher, speaking in full coherent sentences. Pt denies CP, SOB, fevers, chills, N/V/D, HA, vision changes. Comfort & safety provided.

## 2023-09-14 NOTE — H&P ADULT - NSHPLABSRESULTS_GEN_ALL_CORE
8.1    5.59  )-----------( 189      ( 13 Sep 2023 21:34 )             27.4     09-13    142  |  107  |  12  ----------------------------<  98  3.8   |  24  |  1.04    Ca    9.6      13 Sep 2023 21:34    TPro  7.2  /  Alb  3.4  /  TBili  0.5  /  DBili  x   /  AST  45<H>  /  ALT  20  /  AlkPhos  184<H>  09-13      Troponin T, High Sensitivity Result: 18:  (09.13.23 @ 21:34)    Pro-Brain Natriuretic Peptide: 329 pg/mL (09.13.23 @ 21:34)      - - - - - - - - - - - - - - - - - - - - - - - - - - - - - - - - - - - - - - - - - - - - - - - - - - - -       EKG personally reviewed:  NSR 75bpm     Images personally reviewed:   < from: Xray Chest 1 View- PORTABLE-Urgent (09.13.23 @ 21:41) >    IMPRESSION:  No focal consolidations.

## 2023-09-14 NOTE — H&P ADULT - PROBLEM SELECTOR PLAN 1
Obese pt with multiple risk factor ( htn, DM) present  for CP, sob  - Afebrile, VSS, clinically nontoxic   - Labs notable for anemia, ?TACOS, (bL  unclear, no prior to compare), mild elevated BNP, trop neg   - EKG: NSR   - CXR neg   - Telemetry   - Echo  - Risk stratify: A1c, lipid panel  - Check TSH   - Cardio consult to be called in AM, O/p Cardio Dr. Beny Garber

## 2023-09-14 NOTE — H&P ADULT - NSHPPHYSICALEXAM_GEN_ALL_CORE
T(C): 36.4 (09-14-23 @ 01:24), Max: 36.7 (09-13-23 @ 20:19)  HR: 63 (09-14-23 @ 01:24) (63 - 77)  BP: 178/74 (09-14-23 @ 01:24) (178/74 - 204/94)  RR: 18 (09-14-23 @ 01:24) (18 - 20)  SpO2: 96% (09-14-23 @ 01:24) (96% - 99%)    CONSTITUTIONAL: Well groomed, no apparent distress  EYES: PERRLA and symmetric, EOMI  ENMT: MMM. Normal dentition  RESP: No respiratory distress, no use of accessory muscles; CTA b/l  CV: +S1S2, RRR,  no peripheral edema  GI: Soft, NTND  MSK: Normal gait; normal pain free ROM x4 extremities   SKIN: No rashes or ulcers noted  NEURO: CN II-XII grossly intact; normal reflexes, sensation intact throughout   PSYCH: Appropriate insight/judgment; A+O x 3, mood and affect appropriate

## 2023-09-14 NOTE — PROVIDER CONTACT NOTE (OTHER) - ASSESSMENT
Pt A&Ox4, able to make needs known. Pt asymptomatic. VSS. No s/s of bleeding. Pt denies cp, denies SOB, denies pain.

## 2023-09-14 NOTE — CHART NOTE - NSCHARTNOTEFT_GEN_A_CORE
patient examined/ chart reviewed  CTa  Echo  Cardiology evaluation dr Pryor.  Further action as per clinical course   Brock Randolph MD phone [phone] patient examined/ chart reviewed  CTa  Echo  Cardiology evaluation dr Pryor.  Further action as per clinical course   Brock Randolph MD phone [phone] patient examined/ chart reviewed  CTa  Echo  Cardiology evaluation dr Pryor.  Further action as per clinical course   Brock Randolph MD phone [phone]

## 2023-09-14 NOTE — PATIENT PROFILE ADULT - FALL HARM RISK - HARM RISK INTERVENTIONS
Assistance with ambulation/Assistance OOB with selected safe patient handling equipment/Communicate Risk of Fall with Harm to all staff/Discuss with provider need for PT consult/Monitor gait and stability/Provide patient with walking aids - walker, cane, crutches/Reinforce activity limits and safety measures with patient and family/Tailored Fall Risk Interventions/Visual Cue: Yellow wristband and red socks/Bed in lowest position, wheels locked, appropriate side rails in place/Call bell, personal items and telephone in reach/Instruct patient to call for assistance before getting out of bed or chair/Non-slip footwear when patient is out of bed/[place] to call system/Physically safe environment - no spills, clutter or unnecessary equipment/Purposeful Proactive Rounding/Room/bathroom lighting operational, light cord in reach Assistance with ambulation/Assistance OOB with selected safe patient handling equipment/Communicate Risk of Fall with Harm to all staff/Discuss with provider need for PT consult/Monitor gait and stability/Provide patient with walking aids - walker, cane, crutches/Reinforce activity limits and safety measures with patient and family/Tailored Fall Risk Interventions/Visual Cue: Yellow wristband and red socks/Bed in lowest position, wheels locked, appropriate side rails in place/Call bell, personal items and telephone in reach/Instruct patient to call for assistance before getting out of bed or chair/Non-slip footwear when patient is out of bed/[place] to call system/Physically safe environment - no spills, clutter or unnecessary equipment/Purposeful Proactive Rounding/Room/bathroom lighting operational, light cord in reach Assistance with ambulation/Assistance OOB with selected safe patient handling equipment/Communicate Risk of Fall with Harm to all staff/Discuss with provider need for PT consult/Monitor gait and stability/Provide patient with walking aids - walker, cane, crutches/Reinforce activity limits and safety measures with patient and family/Tailored Fall Risk Interventions/Visual Cue: Yellow wristband and red socks/Bed in lowest position, wheels locked, appropriate side rails in place/Call bell, personal items and telephone in reach/Instruct patient to call for assistance before getting out of bed or chair/Non-slip footwear when patient is out of bed/[place] to call system/Physically safe environment - no spills, clutter or unnecessary equipment/Purposeful Proactive Rounding/Room/bathroom lighting operational, light cord in reach

## 2023-09-14 NOTE — H&P ADULT - PROBLEM SELECTOR PLAN 5
- Hold home Metformin   - ISS ACHS  - Check FS, adjust insulin accordingly   - DASH/CC diet  - Check A1c

## 2023-09-14 NOTE — CONSULT NOTE ADULT - SUBJECTIVE AND OBJECTIVE BOX
CHIEF COMPLAINT: cp sob    HISTORY OF PRESENT ILLNESS:  78y F pmh htn, hld, dm, p/w  2-3 days of substernal non- radiating CP, SOB, and progressive CHEATHAM x past few weeks. Now only able to tolerate 10-15 feet before becoming winded. No orthopnea. Also report worsening b/l LE pitting edema. Was eval by PCP who was concerned w test results and referred pt to Cardio-- Dr. Beny Garber,  who sent Pt  to ED for likely stress test/echo, +/- cath.     ROS: Denies palpitation, N/V/D, fever, cough, chills, dizziness, abm pain, recent travel, sick contact, change in bowel and urinary habits     A 10-system ROS was performed and is negative except as noted above and/or in the HPI.      Allergies    No Known Allergies    Intolerances    	    MEDICATIONS:  aspirin enteric coated 81 milliGRAM(s) Oral daily  heparin   Injectable 5000 Unit(s) SubCutaneous every 12 hours  losartan 100 milliGRAM(s) Oral daily        acetaminophen     Tablet .. 650 milliGRAM(s) Oral every 6 hours PRN  melatonin 3 milliGRAM(s) Oral at bedtime PRN  ondansetron Injectable 4 milliGRAM(s) IV Push every 8 hours PRN    aluminum hydroxide/magnesium hydroxide/simethicone Suspension 30 milliLiter(s) Oral every 4 hours PRN  pantoprazole    Tablet 40 milliGRAM(s) Oral before breakfast  senna 2 Tablet(s) Oral at bedtime    atorvastatin 80 milliGRAM(s) Oral at bedtime  dextrose 50% Injectable 25 Gram(s) IV Push once  dextrose 50% Injectable 12.5 Gram(s) IV Push once  dextrose 50% Injectable 25 Gram(s) IV Push once  dextrose Oral Gel 15 Gram(s) Oral once PRN  glucagon  Injectable 1 milliGRAM(s) IntraMuscular once  insulin lispro (ADMELOG) corrective regimen sliding scale   SubCutaneous at bedtime  insulin lispro (ADMELOG) corrective regimen sliding scale   SubCutaneous three times a day before meals    dextrose 5%. 1000 milliLiter(s) IV Continuous <Continuous>  dextrose 5%. 1000 milliLiter(s) IV Continuous <Continuous>      PAST MEDICAL & SURGICAL HISTORY:  HLD (hyperlipidemia)      DM (diabetes mellitus)      Osteoarthritis      HTN (hypertension)      History of diverticulitis      History of bowel resection          FAMILY HISTORY:      SOCIAL HISTORY:    non smoker. indep in adl      REVIEW OF SYSTEMS:  See HPI, otherwise complete 10 point review of systems negative    [ ] All others negative	      PHYSICAL EXAM:  T(C): 36.6 (09-14-23 @ 05:41), Max: 36.7 (09-13-23 @ 20:19)  HR: 59 (09-14-23 @ 05:41) (59 - 77)  BP: 149/109 (09-14-23 @ 05:41) (149/109 - 204/94)  RR: 18 (09-14-23 @ 05:41) (18 - 20)  SpO2: 98% (09-14-23 @ 05:41) (96% - 99%)  Wt(kg): --  I&O's Summary      Appearance: No Acute Distress	  HEENT:  Normal oral mucosa, PERRL, EOMI	  Cardiovascular: Normal S1 S2, No JVD, No murmurs/rubs/gallops  Respiratory: Lungs clear to auscultation bilaterally  Gastrointestinal:  Soft, Non-tender, + BS	  Skin: No rashes, No ecchymoses, No cyanosis	  Neurologic: Non-focal  Extremities: No clubbing, cyanosis or edema  Vascular: Peripheral pulses palpable 2+ bilaterally  Psychiatry: A & O x 3, Mood & affect appropriate    Laboratory Data:	 	    CBC Full  -  ( 14 Sep 2023 05:42 )  WBC Count : 4.71 K/uL  Hemoglobin : 7.8 g/dL  Hematocrit : 26.4 %  Platelet Count - Automated : 185 K/uL  Mean Cell Volume : 83.5 fl  Mean Cell Hemoglobin : 24.7 pg  Mean Cell Hemoglobin Concentration : 29.5 gm/dL  Auto Neutrophil # : x  Auto Lymphocyte # : x  Auto Monocyte # : x  Auto Eosinophil # : x  Auto Basophil # : x  Auto Neutrophil % : x  Auto Lymphocyte % : x  Auto Monocyte % : x  Auto Eosinophil % : x  Auto Basophil % : x    09-14    139  |  105  |  14  ----------------------------<  108<H>  3.8   |  21<L>  |  1.15  09-14    138  |  106  |  14  ----------------------------<  110<H>  3.7   |  22  |  1.17    Ca    9.4      14 Sep 2023 05:42  Ca    9.5      14 Sep 2023 04:13    TPro  7.3  /  Alb  3.2<L>  /  TBili  0.6  /  DBili  x   /  AST  44<H>  /  ALT  21  /  AlkPhos  185<H>  09-14  TPro  7.2  /  Alb  3.4  /  TBili  0.5  /  DBili  x   /  AST  45<H>  /  ALT  20  /  AlkPhos  184<H>  09-13      proBNP:   Lipid Profile:   HgA1c:   TSH:       CARDIAC MARKERS:            Interpretation of Telemetry: 	    ECG:  	  RADIOLOGY:  OTHER: 	    PREVIOUS DIAGNOSTIC TESTING:    [ ] Echocardiogram:  [ ] Catheterization:  [ ] Stress Test:  	    Assessment:  78y F pmh htn, hld, dm, p/w  2-3 days of substernal non- radiating CP, SOB, and progressive CHEATHAM x past few weeks    Recs:  cardiac stable  no e/o acs by ecg or biomarkers  elevated d-dimer --> obtain CTA chest and lower ext venous duplex  cardiac cath tomm if cta neg for PE  vol status acceptabe, cxr clear, bnp level normal for age = hold diuretics for now  obtain echo  will follow          Greater than 60 minutes spent on total encounter; more than 50% of the visit was spent counseling and/or coordinating care by the attending physician.   	  Beny Pryor MD   Cardiovascular Diseases  [phone]     CHIEF COMPLAINT: cp sob    HISTORY OF PRESENT ILLNESS:  78y F pmh htn, hld, dm, p/w  2-3 days of substernal non- radiating CP, SOB, and progressive CHEATHAM x past few weeks. Now only able to tolerate 10-15 feet before becoming winded. No orthopnea. Also report worsening b/l LE pitting edema. Was eval by PCP who was concerned w test results and referred pt to Cardio-- Dr. Beny Garber,  who sent Pt  to ED for likely stress test/echo, +/- cath.     ROS: Denies palpitation, N/V/D, fever, cough, chills, dizziness, abm pain, recent travel, sick contact, change in bowel and urinary habits     A 10-system ROS was performed and is negative except as noted above and/or in the HPI.      Allergies    No Known Allergies    Intolerances    	    MEDICATIONS:  aspirin enteric coated 81 milliGRAM(s) Oral daily  heparin   Injectable 5000 Unit(s) SubCutaneous every 12 hours  losartan 100 milliGRAM(s) Oral daily        acetaminophen     Tablet .. 650 milliGRAM(s) Oral every 6 hours PRN  melatonin 3 milliGRAM(s) Oral at bedtime PRN  ondansetron Injectable 4 milliGRAM(s) IV Push every 8 hours PRN    aluminum hydroxide/magnesium hydroxide/simethicone Suspension 30 milliLiter(s) Oral every 4 hours PRN  pantoprazole    Tablet 40 milliGRAM(s) Oral before breakfast  senna 2 Tablet(s) Oral at bedtime    atorvastatin 80 milliGRAM(s) Oral at bedtime  dextrose 50% Injectable 25 Gram(s) IV Push once  dextrose 50% Injectable 12.5 Gram(s) IV Push once  dextrose 50% Injectable 25 Gram(s) IV Push once  dextrose Oral Gel 15 Gram(s) Oral once PRN  glucagon  Injectable 1 milliGRAM(s) IntraMuscular once  insulin lispro (ADMELOG) corrective regimen sliding scale   SubCutaneous at bedtime  insulin lispro (ADMELOG) corrective regimen sliding scale   SubCutaneous three times a day before meals    dextrose 5%. 1000 milliLiter(s) IV Continuous <Continuous>  dextrose 5%. 1000 milliLiter(s) IV Continuous <Continuous>      PAST MEDICAL & SURGICAL HISTORY:  HLD (hyperlipidemia)      DM (diabetes mellitus)      Osteoarthritis      HTN (hypertension)      History of diverticulitis      History of bowel resection          FAMILY HISTORY:      SOCIAL HISTORY:    non smoker. indep in adl      REVIEW OF SYSTEMS:  See HPI, otherwise complete 10 point review of systems negative    [ ] All others negative	      PHYSICAL EXAM:  T(C): 36.6 (09-14-23 @ 05:41), Max: 36.7 (09-13-23 @ 20:19)  HR: 59 (09-14-23 @ 05:41) (59 - 77)  BP: 149/109 (09-14-23 @ 05:41) (149/109 - 204/94)  RR: 18 (09-14-23 @ 05:41) (18 - 20)  SpO2: 98% (09-14-23 @ 05:41) (96% - 99%)  Wt(kg): --  I&O's Summary      Appearance: No Acute Distress	  HEENT:  Normal oral mucosa, PERRL, EOMI	  Cardiovascular: Normal S1 S2, No JVD, No murmurs/rubs/gallops  Respiratory: Lungs clear to auscultation bilaterally  Gastrointestinal:  Soft, Non-tender, + BS	  Skin: No rashes, No ecchymoses, No cyanosis	  Neurologic: Non-focal  Extremities: No clubbing, cyanosis or edema  Vascular: Peripheral pulses palpable 2+ bilaterally  Psychiatry: A & O x 3, Mood & affect appropriate    Laboratory Data:	 	    CBC Full  -  ( 14 Sep 2023 05:42 )  WBC Count : 4.71 K/uL  Hemoglobin : 7.8 g/dL  Hematocrit : 26.4 %  Platelet Count - Automated : 185 K/uL  Mean Cell Volume : 83.5 fl  Mean Cell Hemoglobin : 24.7 pg  Mean Cell Hemoglobin Concentration : 29.5 gm/dL  Auto Neutrophil # : x  Auto Lymphocyte # : x  Auto Monocyte # : x  Auto Eosinophil # : x  Auto Basophil # : x  Auto Neutrophil % : x  Auto Lymphocyte % : x  Auto Monocyte % : x  Auto Eosinophil % : x  Auto Basophil % : x    09-14    139  |  105  |  14  ----------------------------<  108<H>  3.8   |  21<L>  |  1.15  09-14    138  |  106  |  14  ----------------------------<  110<H>  3.7   |  22  |  1.17    Ca    9.4      14 Sep 2023 05:42  Ca    9.5      14 Sep 2023 04:13    TPro  7.3  /  Alb  3.2<L>  /  TBili  0.6  /  DBili  x   /  AST  44<H>  /  ALT  21  /  AlkPhos  185<H>  09-14  TPro  7.2  /  Alb  3.4  /  TBili  0.5  /  DBili  x   /  AST  45<H>  /  ALT  20  /  AlkPhos  184<H>  09-13      proBNP:   Lipid Profile:   HgA1c:   TSH:       CARDIAC MARKERS:            Interpretation of Telemetry: 	    ECG:  	  RADIOLOGY:  OTHER: 	    PREVIOUS DIAGNOSTIC TESTING:    [ ] Echocardiogram:  [ ] Catheterization:  [ ] Stress Test:  	    Assessment:  78y F pmh htn, hld, dm, p/w  2-3 days of substernal non- radiating CP, SOB, and progressive CHEATHAM x past few weeks    Recs:  cardiac stable  no e/o acs by ecg or biomarkers  elevated d-dimer --> obtain CTA chest and lower ext venous duplex  cardiac cath tomm if cta neg for PE  vol status acceptabe, cxr clear, bnp level normal for age = hold diuretics for now  obtain echo  will follow          Greater than 60 minutes spent on total encounter; more than 50% of the visit was spent counseling and/or coordinating care by the attending physician.   	  Beny Pryor MD   Cardiovascular Diseases  [phone]     CHIEF COMPLAINT: cp sob    HISTORY OF PRESENT ILLNESS:  78y F pmh htn, hld, dm, p/w  2-3 days of substernal non- radiating CP, SOB, and progressive CHEATHAM x past few weeks. Now only able to tolerate 10-15 feet before becoming winded. No orthopnea. Also report worsening b/l LE pitting edema. Was eval by PCP who was concerned w test results and referred pt to Cardio-- Dr. Beny Garber,  who sent Pt  to ED for likely stress test/echo, +/- cath.     ROS: Denies palpitation, N/V/D, fever, cough, chills, dizziness, abm pain, recent travel, sick contact, change in bowel and urinary habits     A 10-system ROS was performed and is negative except as noted above and/or in the HPI.      Allergies    No Known Allergies    Intolerances    	    MEDICATIONS:  aspirin enteric coated 81 milliGRAM(s) Oral daily  heparin   Injectable 5000 Unit(s) SubCutaneous every 12 hours  losartan 100 milliGRAM(s) Oral daily        acetaminophen     Tablet .. 650 milliGRAM(s) Oral every 6 hours PRN  melatonin 3 milliGRAM(s) Oral at bedtime PRN  ondansetron Injectable 4 milliGRAM(s) IV Push every 8 hours PRN    aluminum hydroxide/magnesium hydroxide/simethicone Suspension 30 milliLiter(s) Oral every 4 hours PRN  pantoprazole    Tablet 40 milliGRAM(s) Oral before breakfast  senna 2 Tablet(s) Oral at bedtime    atorvastatin 80 milliGRAM(s) Oral at bedtime  dextrose 50% Injectable 25 Gram(s) IV Push once  dextrose 50% Injectable 12.5 Gram(s) IV Push once  dextrose 50% Injectable 25 Gram(s) IV Push once  dextrose Oral Gel 15 Gram(s) Oral once PRN  glucagon  Injectable 1 milliGRAM(s) IntraMuscular once  insulin lispro (ADMELOG) corrective regimen sliding scale   SubCutaneous at bedtime  insulin lispro (ADMELOG) corrective regimen sliding scale   SubCutaneous three times a day before meals    dextrose 5%. 1000 milliLiter(s) IV Continuous <Continuous>  dextrose 5%. 1000 milliLiter(s) IV Continuous <Continuous>      PAST MEDICAL & SURGICAL HISTORY:  HLD (hyperlipidemia)      DM (diabetes mellitus)      Osteoarthritis      HTN (hypertension)      History of diverticulitis      History of bowel resection          FAMILY HISTORY:      SOCIAL HISTORY:    non smoker. indep in adl      REVIEW OF SYSTEMS:  See HPI, otherwise complete 10 point review of systems negative    [ ] All others negative	      PHYSICAL EXAM:  T(C): 36.6 (09-14-23 @ 05:41), Max: 36.7 (09-13-23 @ 20:19)  HR: 59 (09-14-23 @ 05:41) (59 - 77)  BP: 149/109 (09-14-23 @ 05:41) (149/109 - 204/94)  RR: 18 (09-14-23 @ 05:41) (18 - 20)  SpO2: 98% (09-14-23 @ 05:41) (96% - 99%)  Wt(kg): --  I&O's Summary      Appearance: No Acute Distress	  HEENT:  Normal oral mucosa, PERRL, EOMI	  Cardiovascular: Normal S1 S2, No JVD, No murmurs/rubs/gallops  Respiratory: Lungs clear to auscultation bilaterally  Gastrointestinal:  Soft, Non-tender, + BS	  Skin: No rashes, No ecchymoses, No cyanosis	  Neurologic: Non-focal  Extremities: No clubbing, cyanosis or edema  Vascular: Peripheral pulses palpable 2+ bilaterally  Psychiatry: A & O x 3, Mood & affect appropriate    Laboratory Data:	 	    CBC Full  -  ( 14 Sep 2023 05:42 )  WBC Count : 4.71 K/uL  Hemoglobin : 7.8 g/dL  Hematocrit : 26.4 %  Platelet Count - Automated : 185 K/uL  Mean Cell Volume : 83.5 fl  Mean Cell Hemoglobin : 24.7 pg  Mean Cell Hemoglobin Concentration : 29.5 gm/dL  Auto Neutrophil # : x  Auto Lymphocyte # : x  Auto Monocyte # : x  Auto Eosinophil # : x  Auto Basophil # : x  Auto Neutrophil % : x  Auto Lymphocyte % : x  Auto Monocyte % : x  Auto Eosinophil % : x  Auto Basophil % : x    09-14    139  |  105  |  14  ----------------------------<  108<H>  3.8   |  21<L>  |  1.15  09-14    138  |  106  |  14  ----------------------------<  110<H>  3.7   |  22  |  1.17    Ca    9.4      14 Sep 2023 05:42  Ca    9.5      14 Sep 2023 04:13    TPro  7.3  /  Alb  3.2<L>  /  TBili  0.6  /  DBili  x   /  AST  44<H>  /  ALT  21  /  AlkPhos  185<H>  09-14  TPro  7.2  /  Alb  3.4  /  TBili  0.5  /  DBili  x   /  AST  45<H>  /  ALT  20  /  AlkPhos  184<H>  09-13      proBNP:   Lipid Profile:   HgA1c:   TSH:       CARDIAC MARKERS:            Interpretation of Telemetry: 	    ECG:  	  RADIOLOGY:  OTHER: 	    PREVIOUS DIAGNOSTIC TESTING:    [ ] Echocardiogram:  [ ] Catheterization:  [ ] Stress Test:  	    Assessment:  78y F pmh htn, hld, dm, p/w  2-3 days of substernal non- radiating CP, SOB, and progressive CHEATHAM x past few weeks    Recs:  cardiac stable  no e/o acs by ecg or biomarkers  elevated d-dimer --> obtain CTA chest and lower ext venous duplex  cardiac cath tomm if cta neg for PE  vol status acceptabe, cxr clear, bnp level normal for age = hold diuretics for now  obtain echo  will follow          Greater than 60 minutes spent on total encounter; more than 50% of the visit was spent counseling and/or coordinating care by the attending physician.   	  Beny Pryor MD   Cardiovascular Diseases  [phone]

## 2023-09-14 NOTE — H&P ADULT - HISTORY OF PRESENT ILLNESS
78y F pmh htn, hld, dm, p/w  2-3 days of substernal non- radiating CP, SOB, and progressive CHEATHAM x past few weeks. Now only able to tolerate 10-15 feet before becoming winded. No orthopnea. Also report worsening b/l LE pitting edema. Was eval by PCP who was concerned w test results and referred pt to Cardio-- Dr. Beny Garber,  who sent Pt  to ED for likely stress test/echo, +/- cath.     ROS: Denies palpitation, N/V/D, fever, cough, chills, dizziness, abm pain, recent travel, sick contact, change in bowel and urinary habits     A 10-system ROS was performed and is negative except as noted above and/or in the HPI.

## 2023-09-15 LAB
A1C WITH ESTIMATED AVERAGE GLUCOSE RESULT: 6.2 % — HIGH (ref 4–5.6)
ANION GAP SERPL CALC-SCNC: 10 MMOL/L — SIGNIFICANT CHANGE UP (ref 5–17)
BLD GP AB SCN SERPL QL: NEGATIVE — SIGNIFICANT CHANGE UP
BUN SERPL-MCNC: 19 MG/DL — SIGNIFICANT CHANGE UP (ref 7–23)
CALCIUM SERPL-MCNC: 9.5 MG/DL — SIGNIFICANT CHANGE UP (ref 8.4–10.5)
CHLORIDE SERPL-SCNC: 108 MMOL/L — SIGNIFICANT CHANGE UP (ref 96–108)
CO2 SERPL-SCNC: 23 MMOL/L — SIGNIFICANT CHANGE UP (ref 22–31)
CREAT SERPL-MCNC: 1.15 MG/DL — SIGNIFICANT CHANGE UP (ref 0.5–1.3)
EGFR: 49 ML/MIN/1.73M2 — LOW
ESTIMATED AVERAGE GLUCOSE: 131 MG/DL — HIGH (ref 68–114)
FERRITIN SERPL-MCNC: 22 NG/ML — SIGNIFICANT CHANGE UP (ref 13–330)
FOLATE SERPL-MCNC: 8.6 NG/ML — SIGNIFICANT CHANGE UP
GLUCOSE BLDC GLUCOMTR-MCNC: 103 MG/DL — HIGH (ref 70–99)
GLUCOSE BLDC GLUCOMTR-MCNC: 113 MG/DL — HIGH (ref 70–99)
GLUCOSE BLDC GLUCOMTR-MCNC: 149 MG/DL — HIGH (ref 70–99)
GLUCOSE SERPL-MCNC: 103 MG/DL — HIGH (ref 70–99)
HCT VFR BLD CALC: 25.1 % — LOW (ref 34.5–45)
HCV AB S/CO SERPL IA: 0.1 S/CO — SIGNIFICANT CHANGE UP (ref 0–0.99)
HCV AB SERPL-IMP: SIGNIFICANT CHANGE UP
HGB BLD-MCNC: 7.4 G/DL — LOW (ref 11.5–15.5)
IRON SATN MFR SERPL: 29 UG/DL — LOW (ref 30–160)
IRON SATN MFR SERPL: 9 % — LOW (ref 14–50)
MCHC RBC-ENTMCNC: 24.7 PG — LOW (ref 27–34)
MCHC RBC-ENTMCNC: 29.5 GM/DL — LOW (ref 32–36)
MCV RBC AUTO: 83.7 FL — SIGNIFICANT CHANGE UP (ref 80–100)
NRBC # BLD: 0 /100 WBCS — SIGNIFICANT CHANGE UP (ref 0–0)
PLATELET # BLD AUTO: 183 K/UL — SIGNIFICANT CHANGE UP (ref 150–400)
POTASSIUM SERPL-MCNC: 4 MMOL/L — SIGNIFICANT CHANGE UP (ref 3.5–5.3)
POTASSIUM SERPL-SCNC: 4 MMOL/L — SIGNIFICANT CHANGE UP (ref 3.5–5.3)
RBC # BLD: 3 M/UL — LOW (ref 3.8–5.2)
RBC # FLD: 17.6 % — HIGH (ref 10.3–14.5)
RETICS #: 68.4 K/UL — SIGNIFICANT CHANGE UP (ref 25–125)
RETICS/RBC NFR: 2.3 % — SIGNIFICANT CHANGE UP (ref 0.5–2.5)
RH IG SCN BLD-IMP: POSITIVE — SIGNIFICANT CHANGE UP
SODIUM SERPL-SCNC: 141 MMOL/L — SIGNIFICANT CHANGE UP (ref 135–145)
TIBC SERPL-MCNC: 337 UG/DL — SIGNIFICANT CHANGE UP (ref 220–430)
UIBC SERPL-MCNC: 308 UG/DL — SIGNIFICANT CHANGE UP (ref 110–370)
WBC # BLD: 6.17 K/UL — SIGNIFICANT CHANGE UP (ref 3.8–10.5)
WBC # FLD AUTO: 6.17 K/UL — SIGNIFICANT CHANGE UP (ref 3.8–10.5)

## 2023-09-15 PROCEDURE — 73502 X-RAY EXAM HIP UNI 2-3 VIEWS: CPT | Mod: 26,LT

## 2023-09-15 PROCEDURE — 93970 EXTREMITY STUDY: CPT | Mod: 26

## 2023-09-15 PROCEDURE — 71275 CT ANGIOGRAPHY CHEST: CPT | Mod: 26

## 2023-09-15 RX ORDER — ASCORBIC ACID 60 MG
500 TABLET,CHEWABLE ORAL DAILY
Refills: 0 | Status: DISCONTINUED | OUTPATIENT
Start: 2023-09-15 | End: 2023-09-21

## 2023-09-15 RX ORDER — CHLORHEXIDINE GLUCONATE 213 G/1000ML
1 SOLUTION TOPICAL
Refills: 0 | Status: DISCONTINUED | OUTPATIENT
Start: 2023-09-15 | End: 2023-09-21

## 2023-09-15 RX ADMIN — Medication 650 MILLIGRAM(S): at 22:30

## 2023-09-15 RX ADMIN — LOSARTAN POTASSIUM 100 MILLIGRAM(S): 100 TABLET, FILM COATED ORAL at 05:16

## 2023-09-15 RX ADMIN — HEPARIN SODIUM 5000 UNIT(S): 5000 INJECTION INTRAVENOUS; SUBCUTANEOUS at 05:16

## 2023-09-15 RX ADMIN — PANTOPRAZOLE SODIUM 40 MILLIGRAM(S): 20 TABLET, DELAYED RELEASE ORAL at 05:16

## 2023-09-15 RX ADMIN — Medication 81 MILLIGRAM(S): at 12:30

## 2023-09-15 RX ADMIN — Medication 650 MILLIGRAM(S): at 21:20

## 2023-09-15 RX ADMIN — Medication 650 MILLIGRAM(S): at 05:16

## 2023-09-15 RX ADMIN — SENNA PLUS 2 TABLET(S): 8.6 TABLET ORAL at 21:20

## 2023-09-15 RX ADMIN — ATORVASTATIN CALCIUM 80 MILLIGRAM(S): 80 TABLET, FILM COATED ORAL at 21:20

## 2023-09-15 RX ADMIN — HEPARIN SODIUM 5000 UNIT(S): 5000 INJECTION INTRAVENOUS; SUBCUTANEOUS at 17:48

## 2023-09-15 NOTE — DIETITIAN INITIAL EVALUATION ADULT - NSFNSADHERENCEPTAFT_GEN_A_CORE
Prior to admission pt stated she follows a regular diet at home, no therapeutic restrictions. Pt states she takes Metformin daily for her diabetes, does not check blood glucose fingersticks.

## 2023-09-15 NOTE — DIETITIAN INITIAL EVALUATION ADULT - ENERGY INTAKE
Abdominal Pain, N/V/D Pt stated appetite has improved since being admitted to the hospital. Stated she's been eating ~% of meals provided in-house./Adequate (%) Pt states her appetite and intake has improved in-house. Pt reported eating % of meals provided, stated "the food is good here." Pt amenable to trialing Glucerna 1x/day to optimize protein-energy intake. RD obtained food preferences, will honor as able.

## 2023-09-15 NOTE — DIETITIAN INITIAL EVALUATION ADULT - ETIOLOGY
related to increased physiological demand for nutrients  related to suspected energy intake exceeding energy expenditure

## 2023-09-15 NOTE — DIETITIAN INITIAL EVALUATION ADULT - NSFNSPHYEXAMSKINFT_GEN_A_CORE
Pressure Injury 1: Right:, buttocks, Suspected deep tissue injury (per flow sheets).     Per Advanced Practice Nurse Consult-Wound Ostomy Care note on 09-15: "Bilateral buttocks deep tissue injury"

## 2023-09-15 NOTE — DIETITIAN INITIAL EVALUATION ADULT - PERSON TAUGHT/METHOD
Provided education on high-protein diet for wound healing and preservation of lean body mass. Encouraged consumption of HBV foods, prioritizing protein foods first at meal times, and importance of  meeting adequate protein-energy needs. Reviewed Diabetes education. Emphasized importance of limiting added sugars, refined carbohydrates and pairing protein foods with carbohydrates for glycemic control. Obtained food preferences, RD to honor as able. Pt aware RD remains available./verbal instruction/patient instructed

## 2023-09-15 NOTE — DIETITIAN INITIAL EVALUATION ADULT - OTHER CALCULATIONS
Estimated protein-energy needs calculated using IBW + 10%, 107.8 pounds with consideration for BMI > 40 and deep tissue injury.   Defer fluid calculations to the medical team.

## 2023-09-15 NOTE — DIETITIAN INITIAL EVALUATION ADULT - PERTINENT LABORATORY DATA
09-15    141  |  108  |  19  ----------------------------<  103<H>  4.0   |  23  |  1.15    Ca    9.5      15 Sep 2023 06:44    TPro  7.3  /  Alb  3.2<L>  /  TBili  0.6  /  DBili  x   /  AST  44<H>  /  ALT  21  /  AlkPhos  185<H>  09-14  POCT Blood Glucose.: 103 mg/dL (09-15-23 @ 11:48)  A1C with Estimated Average Glucose Result: 6.2 % (09-15-23 @ 06:47)

## 2023-09-15 NOTE — DIETITIAN INITIAL EVALUATION ADULT - ORAL INTAKE PTA/DIET HISTORY
Nutrition assessment completed at bedside with assistance from translation services (ID#[id number] - Alison). Pt stated she has always had a small appetite, only eats breakfast and dinner at home. Pt reported decreased intake ~1 month prior to admission in setting of frequent pain and poor appetite. Stated she has only been eating ~50% of meals at home. Pt endorsed ~ 8 pound wt loss x 4 months. Pt also stated all of her foods taste bitter - unsure why. Pt stated she is 4 ft 11 inches tall. Confirmed no known food allergies.  Nutrition assessment completed at bedside with assistance from translation services (ID#[id number] - Alison). Pt stated she has always had a small appetite, only eats breakfast and dinner at home. Pt reported decreased intake ~1 month prior to admission in setting of frequent pain and poor appetite. Stated she has only been eating ~50% of meals at home. Pt endorsed ~ 8 pound wt loss x 4 months. Pt also stated all of her foods taste bitter - unsure why. Pt stated she is 4 ft 11 inches tall. Confirmed no known food allergies.  Nutrition assessment completed at bedside with assistance from translation services (ID#[id number] - Alison). Pt stated she has always had a small appetite, only eats breakfast and dinner at home. Pt reported decreased intake ~1 month prior to admission in setting of frequent pain and poor appetite. Stated she has only been eating ~50% of meals at home. Pt endorsed ~ 8 pound wt loss x 4 months. Pt also stated all of her foods taste bitter - unsure why. Pt stated she is 4 ft 11 inches tall. Confirmed no known food allergies.

## 2023-09-15 NOTE — ADVANCED PRACTICE NURSE CONSULT - ASSESSMENT
arrived on unit, patient was found lying in a low air loss pressure redistribution support surface style bed.  patient  is unable to turn independently and staff assistance x 1was provided, and able to view her skin .   buttocks non-blanchable deep purple    discoloration and hyperpigmentation  consistent with deep tissue  size approximately  5 cm x 2cm x 0 cm.  present  on admission.  Patient was educated on the importance for turning and positioning every 2 hours. the use of waffle cushion when out of bed to chair and, to shift her weight every 2 hours while in chair. the importance of keeping skin clean and dry and, to offload her heels/feet .and optimal nutrition .patient verbalized understanding by teach back   arrived on unit, patient was found lying in a low air loss pressure redistribution support surface style bed.  patient  is unable to turn independently and staff assistance x 1was provided, and able to view her skin .   bilateral buttocks non-blanchable deep purple    discoloration and hyperpigmentation  consistent with deep tissue  size approximately  5 cm x 2cm x 0 cm.  present  on admission.  Patient was educated on the importance for turning and positioning every 2 hours. the use of waffle cushion when out of bed to chair and, to shift her weight every 2 hours while in chair. the importance of keeping skin clean and dry and, to offload her heels/feet .and optimal nutrition .patient verbalized understanding by teach back

## 2023-09-15 NOTE — PROGRESS NOTE ADULT - ASSESSMENT
78y F pmh htn, hld, dm, p/w  2-3 days of substernal non- radiating CP, SOB, and progressive CHEATHAM x past few weeks admitted for CP w/u    Angina pectoris.   - Obese pt with multiple risk factor ( htn, DM) present  for CP, sob  - Afebrile, VSS, clinically nontoxic   - Labs notable for anemia, ?TACOS, (bL  unclear, no prior to compare), mild elevated BNP, trop neg   - EKG: NSR   - CXR neg   - Telemetry   - Echo  - Risk stratify: A1c, lipid panel  - Check TSH   - Cath pending   - Cardio follow Beny Pryor    Anemia iron deficiency  - GI evaluation     D Dimer elevated  - CTa no embolus  - LED no DVT    HTN (hypertension).   - Resume home med--- losartan 100mg   - Monitor.    HLD (hyperlipidemia).   - c/w statin.    DM2 (diabetes mellitus, type 2).   - Hold home Metformin   - ISS ACHS  - Check FS, adjust insulin accordingly   - DASH/CC diet  - Check A1c.    Prophylactic measure.   - DVT ppx: Heparin sq  - GI ppx: PPI   - Diet: DASH/ CC.    Brock Randolph MD phone [phone]  78y F pmh htn, hld, dm, p/w  2-3 days of substernal non- radiating CP, SOB, and progressive CHEATHAM x past few weeks admitted for CP w/u    Angina pectoris.   - Obese pt with multiple risk factor ( htn, DM) present  for CP, sob  - Afebrile, VSS, clinically nontoxic   - Labs notable for anemia, ?TACOS, (bL  unclear, no prior to compare), mild elevated BNP, trop neg   - EKG: NSR   - CXR neg   - Telemetry   - Echo  - Risk stratify: A1c, lipid panel  - Check TSH   - Cath pending   - Cardio follow Beny Pryor    Anemia iron deficiency  - GI evaluation     D Dimer elevated  - CTa no embolus  - LED no DVT    HTN (hypertension).   - Resume home med--- losartan 100mg   - Monitor.    HLD (hyperlipidemia).   - c/w statin.    DM2 (diabetes mellitus, type 2).   - Hold home Metformin   - ISS ACHS  - Check FS, adjust insulin accordingly   - DASH/CC diet  - Check A1c.    Prophylactic measure.   - DVT ppx: Heparin sq  - GI ppx: PPI   - Diet: DASH/ CC.    Brock Randolph MD phone [phone]  78y F pmh htn, hld, dm, p/w  2-3 days of substernal non- radiating CP, SOB, and progressive CHEATHAM x past few weeks admitted for CP w/u    Angina pectoris.   - Obese pt with multiple risk factor ( htn, DM) present  for CP, sob  - Afebrile, VSS, clinically nontoxic   - Labs notable for anemia, ?TACOS, (bL  unclear, no prior to compare), mild elevated BNP, trop neg   - EKG: NSR   - CXR neg   - Telemetry   - Echo  - Risk stratify: A1c, lipid panel  - Check TSH   - Cath pending   - Cardio follow Beny Pryor    Anemia iron deficiency  - GI evaluation     D Dimer elevated  - CTa no embolus  - LED no DVT    HTN (hypertension).   - Resume home med--- losartan 100mg   - Monitor.    HLD (hyperlipidemia).   - c/w statin.    DM2 (diabetes mellitus, type 2).   - Hold home Metformin   - ISS ACHS  - Check FS, adjust insulin accordingly   - DASH/CC diet  - Check A1c.    Prophylactic measure.   - DVT ppx: Heparin sq  - GI ppx: PPI   - Diet: DASH/ CC.    Brock Ranodlph MD phone [phone]

## 2023-09-15 NOTE — DIETITIAN INITIAL EVALUATION ADULT - NAME AND PHONE
Chanda Snider RDN   Pager #[phone]  Chanda Snider RDN   Pager #[phone]  Chanda Snider RDN   Pager #[phone]

## 2023-09-15 NOTE — DIETITIAN INITIAL EVALUATION ADULT - OTHER INFO
Weights:  - UBW (per patient): 211 pounds (May 2023)  - Dosing Weight (per chart): No dosing weight documented in chart.   - Daily Weights (per flow sheets): No daily weights documented in chart.   - Bed Scale Weight (taken by RD): RD unable to obtain bed scale weight, pt seated upright in chair at time of interview.   - Per [facility] HIE: 212 pounds (08-), 211 pounds (08-)  Possible 3.7% wt loss x 4 months, not clinically significant at this time. RD to continue to monitor weights and trends as able. Will continue to monitor PO intake.     Pt with history of DM (per chart).  - A1C 6.2% (09-15) indicating good glycemic control for age (per chart).   - Estimated Average Glucose 131 mg/dL (09-15) (per chart).    - Ordered for Insulin Lispro (ADMELOG) Corrective Regimen Sliding Scale (per orders).     Pt receiving IV Dextrose 5% (per orders).  Weights:  - UBW (per patient): 211 pounds (May 2023)  - Dosing Weight (per chart): No dosing weight documented in chart.   - Daily Weights (per flow sheets): No daily weights documented in chart.   - Bed Scale Weight (taken by RD): RD unable to obtain bed scale weight, pt seated upright in chair at time of interview.   - Per [facility] HIE: 212 pounds (08-), 211 pounds (08-)  Possible 3.7% wt loss x 4 months, not clinically significant at this time. RD to continue to monitor weights and trends as able. Will continue to monitor PO intake.     Pt with history of DM (per chart).  - A1C 6.2% (09-15) indicating good glycemic control for age (per chart).   - Estimated Average Glucose 131 mg/dL (09-15) (per chart).    - Ordered for Insulin Lispro (ADMELOG) Corrective Regimen Sliding Scale (per orders).     Pt receiving IV Dextrose 5% (per orders).  Weights:  - UBW (per patient): 211 pounds (May 2023)  - Dosing Weight (per chart): No dosing weight documented in chart.   - Daily Weights (per flow sheets): No daily weights documented in chart.   - Bed Scale Weight (taken by RD): RD unable to obtain bed scale weight, pt seated upright in chair at time of interview.   - Per [facility] HIE: 212 pounds (08-), 211 pounds (08-)  Possible 3.7% wt loss x 4 months, not clinically significant at this time. RD to continue to monitor weights and trends as able. Will continue to monitor PO intake.     Pt with history of DM (per chart).  - A1C 6.2% (09-15) indicating good glycemic control for age (per chart).   - Estimated Average Glucose 131 mg/dL (09-15) (per chart).    - Ordered for Insulin Lispro (ADMELOG) Corrective Regimen Sliding Scale (per orders).     Pt receiving IV Dextrose 5% (per orders).

## 2023-09-15 NOTE — DIETITIAN INITIAL EVALUATION ADULT - NS FNS DIET ORDER
Diet, DASH/TLC:   Sodium & Cholesterol Restricted  Consistent Carbohydrate {Evening Snack} (CSTCHOSN) (09-14-23 @ 04:02) [Active]

## 2023-09-15 NOTE — DIETITIAN INITIAL EVALUATION ADULT - NSFNSGIIOFT_GEN_A_CORE
Pt reported no nausea, vomiting, diarrhea or constipation at this time.   - Note: Pt is ordered for Zofran, Protonix, aluminum hydroxide/magnesium hydroxide/simethicone suspension (per orders).  Last documented BM: No recent bowel movements documented (per flow sheets).  - Pt reported last BM was this morning (09-15).   Bowel Regimen: Senna (per orders)

## 2023-09-15 NOTE — DIETITIAN INITIAL EVALUATION ADULT - ADD RECOMMEND
1) Recommend continue current diet as tolerated: DASH/TLC, Consistent Carbohydrate w/ Evening Snack  2) Recommend Glucerna 1x/day to optimize protein-energy intake.  3) Recommend Multivitamin and Vitamin C daily for wound healing unless contraindicated.   4) Recommend Abdelrahman BID for wound healing.   5) RD obtained food preferences, will honor as able.   6) Monitor nutritional intake, tolerance to diet prescription, weights, labs, and skin integrity.   7) BMI alert placed in chart.

## 2023-09-15 NOTE — DIETITIAN INITIAL EVALUATION ADULT - NSFNSNUTRHOMESUPPLEMENTFT_GEN_A_CORE
Pt stated she takes a Vitamin D supplement at home. Pt stated she did not consume any oral nutrition supplements prior to admission.

## 2023-09-15 NOTE — ADVANCED PRACTICE NURSE CONSULT - RECOMMEDATIONS
Impression  bilateral buttock deep tissue injury present on admission       Recommendations  1. Bilateral   buttocks  deep tissue injury         Topical therapy- sacral/bilateral buttocks- cleanse w/incontinent cleanser, pat dry & apply martinez moisture barrier cream twice daily & prn soiling .  monitor    for changes .   2. Elevate heels; apply Complete Cair air fluidized boots; ensure that the soles of the feet are not resting on the foot board of the bed.  3.  Incontinent management - incontinent cleanser, pads,  ryan care  BID  4. Maintain on an alternating air with low air loss surface   5. Turn & reposition every 2 hr; Use positioning pillow to turn and reposition, soft pillow between bony prominences; continue measures to decrease friction/shear/pressure.  6. Nutrition optimization.  7.  waffle cushion when out of bed to chair .  plan of care reviewed with covering staff RN    Impression  bilateral buttock deep tissue injury present on admission       Recommendations  1. Bilateral   buttocks  deep tissue injury    Topical therapy- sacral/bilateral buttocks- cleanse w/incontinent cleanser, pat dry & apply martinez moisture barrier cream twice daily & prn soiling .  monitor    for changes .   2. Elevate heels; apply Complete Cair air fluidized boots; ensure that the soles of the feet are not resting on the foot board of the bed.  3.  Incontinent management - incontinent cleanser, pads,  ryan care  BID  4. Maintain on an alternating air with low air loss surface   5. Turn & reposition every 2 hr; Use positioning pillow to turn and reposition, soft pillow between bony prominences; continue measures to decrease friction/shear/pressure.  6. Nutrition optimization.  7.  waffle cushion when out of bed to chair .  plan of care reviewed with covering staff RN

## 2023-09-15 NOTE — ADVANCED PRACTICE NURSE CONSULT - REASON FOR CONSULT
Consult to assess patient skin initiated by RN RIGHT BUTTOCK deep tissue injury present on admission        History of Present Illness:  Reason for Admission: CP. CHEATHAM  History of Present Illness:   78y F pmh htn, hld, dm, p/w  2-3 days of substernal non- radiating CP, SOB, and progressive CHEATHAM x past few weeks. Now only able to tolerate 10-15 feet before becoming winded. No orthopnea. Also report worsening b/l LE pitting edema. Was eval by PCP who was concerned w test results and referred pt to Cardio-- Dr. Beny Garber,  who sent Pt  to ED for likely stress test/echo, +/- cath.     ROS: Denies palpitation, N/V/D, fever, cough, chills, dizziness, abm pain, recent travel, sick contact, change in bowel and urinary habits     A 10-system ROS was performed and is negative except as noted above and/or in the HPI.

## 2023-09-15 NOTE — PROGRESS NOTE ADULT - SUBJECTIVE AND OBJECTIVE BOX
Cardiovascular Disease Progress Note    Overnight events: No acute events overnight.  no new cardiac sx  Otherwise review of systems negative    Objective Findings:  T(C): 36.5 (09-15-23 @ 04:22), Max: 37 (09-14-23 @ 17:04)  HR: 84 (09-15-23 @ 04:22) (68 - 84)  BP: 160/67 (09-14-23 @ 20:59) (160/67 - 184/72)  RR: 18 (09-15-23 @ 04:22) (17 - 18)  SpO2: 97% (09-15-23 @ 04:22) (94% - 99%)  Wt(kg): --  Daily     Daily       Physical Exam:  Gen: NAD  HEENT: EOMI  CV: RRR, normal S1 + S2, no m/r/g  Lungs: CTAB  Abd: soft, non-tender  Ext: No edema    Telemetry:    Laboratory Data:                        7.4    6.17  )-----------( 183      ( 15 Sep 2023 06:47 )             25.1     09-15    141  |  108  |  19  ----------------------------<  103<H>  4.0   |  23  |  1.15    Ca    9.5      15 Sep 2023 06:44    TPro  7.3  /  Alb  3.2<L>  /  TBili  0.6  /  DBili  x   /  AST  44<H>  /  ALT  21  /  AlkPhos  185<H>  09-14    PT/INR - ( 14 Sep 2023 05:40 )   PT: 11.6 sec;   INR: 1.06 ratio           CARDIAC MARKERS ( 14 Sep 2023 04:13 )  x     / x     / 166 U/L / x     / 2.9 ng/mL          Inpatient Medications:  MEDICATIONS  (STANDING):  aspirin enteric coated 81 milliGRAM(s) Oral daily  atorvastatin 80 milliGRAM(s) Oral at bedtime  dextrose 5%. 1000 milliLiter(s) (100 mL/Hr) IV Continuous <Continuous>  dextrose 5%. 1000 milliLiter(s) (50 mL/Hr) IV Continuous <Continuous>  dextrose 50% Injectable 25 Gram(s) IV Push once  dextrose 50% Injectable 25 Gram(s) IV Push once  dextrose 50% Injectable 12.5 Gram(s) IV Push once  glucagon  Injectable 1 milliGRAM(s) IntraMuscular once  heparin   Injectable 5000 Unit(s) SubCutaneous every 12 hours  influenza  Vaccine (HIGH DOSE) 0.7 milliLiter(s) IntraMuscular once  insulin lispro (ADMELOG) corrective regimen sliding scale   SubCutaneous three times a day before meals  insulin lispro (ADMELOG) corrective regimen sliding scale   SubCutaneous at bedtime  losartan 100 milliGRAM(s) Oral daily  pantoprazole    Tablet 40 milliGRAM(s) Oral before breakfast  senna 2 Tablet(s) Oral at bedtime      Assessment:  78y F pmh htn, hld, dm, p/w  2-3 days of substernal non- radiating CP, SOB, and progressive CHEATHAM x past few weeks    Recs:  cardiac stable  no e/o acs by ecg or biomarkers  elevated d-dimer --> obtain CTA chest and lower ext venous duplex  cardiac cath if cta neg for PE  vol status acceptabe, cxr clear, bnp level normal for age = start hctz 12.5mg for improved bp control and e/o elevated LA-P on echo  s/p echo = normal lv/rv fxn, + diastolic dysfunction  will follow          Over 35 minutes spent on total encounter; more than 50% of the visit was spent counseling and/or coordinating care by the attending physician.      Beny Pryor MD   Cardiovascular Disease  [phone] Cardiovascular Disease Progress Note    Overnight events: No acute events overnight.  no new cardiac sx  Otherwise review of systems negative    Objective Findings:  T(C): 36.5 (09-15-23 @ 04:22), Max: 37 (09-14-23 @ 17:04)  HR: 84 (09-15-23 @ 04:22) (68 - 84)  BP: 160/67 (09-14-23 @ 20:59) (160/67 - 184/72)  RR: 18 (09-15-23 @ 04:22) (17 - 18)  SpO2: 97% (09-15-23 @ 04:22) (94% - 99%)  Wt(kg): --  Daily     Daily       Physical Exam:  Gen: NAD  HEENT: EOMI  CV: RRR, normal S1 + S2, no m/r/g  Lungs: CTAB  Abd: soft, non-tender  Ext: No edema    Telemetry:    Laboratory Data:                        7.4    6.17  )-----------( 183      ( 15 Sep 2023 06:47 )             25.1     09-15    141  |  108  |  19  ----------------------------<  103<H>  4.0   |  23  |  1.15    Ca    9.5      15 Sep 2023 06:44    TPro  7.3  /  Alb  3.2<L>  /  TBili  0.6  /  DBili  x   /  AST  44<H>  /  ALT  21  /  AlkPhos  185<H>  09-14    PT/INR - ( 14 Sep 2023 05:40 )   PT: 11.6 sec;   INR: 1.06 ratio           CARDIAC MARKERS ( 14 Sep 2023 04:13 )  x     / x     / 166 U/L / x     / 2.9 ng/mL          Inpatient Medications:  MEDICATIONS  (STANDING):  aspirin enteric coated 81 milliGRAM(s) Oral daily  atorvastatin 80 milliGRAM(s) Oral at bedtime  dextrose 5%. 1000 milliLiter(s) (100 mL/Hr) IV Continuous <Continuous>  dextrose 5%. 1000 milliLiter(s) (50 mL/Hr) IV Continuous <Continuous>  dextrose 50% Injectable 25 Gram(s) IV Push once  dextrose 50% Injectable 25 Gram(s) IV Push once  dextrose 50% Injectable 12.5 Gram(s) IV Push once  glucagon  Injectable 1 milliGRAM(s) IntraMuscular once  heparin   Injectable 5000 Unit(s) SubCutaneous every 12 hours  influenza  Vaccine (HIGH DOSE) 0.7 milliLiter(s) IntraMuscular once  insulin lispro (ADMELOG) corrective regimen sliding scale   SubCutaneous three times a day before meals  insulin lispro (ADMELOG) corrective regimen sliding scale   SubCutaneous at bedtime  losartan 100 milliGRAM(s) Oral daily  pantoprazole    Tablet 40 milliGRAM(s) Oral before breakfast  senna 2 Tablet(s) Oral at bedtime      Assessment:  78y F pmh htn, hld, dm, p/w  2-3 days of substernal non- radiating CP, SOB, and progressive CHEATHAM x past few weeks    Recs:  cardiac stable  no e/o acs by ecg or biomarkers  elevated d-dimer --> obtain CTA chest and lower ext venous duplex  cardiac cath if cta neg for PE  vol status acceptabe, cxr clear, bnp level normal for age = start hctz 12.5mg for improved bp control and e/o elevated LA-P on echo  s/p echo = normal lv/rv fxn, + diastolic dysfunction  will follow          Over 35 minutes spent on total encounter; more than 50% of the visit was spent counseling and/or coordinating care by the attending physician.      Beny Pryor MD   Cardiovascular Disease  [phone] Cardiovascular Disease Progress Note    Overnight events: No acute events overnight.  no new cardiac sx  Otherwise review of systems negative    Objective Findings:  T(C): 36.5 (09-15-23 @ 04:22), Max: 37 (09-14-23 @ 17:04)  HR: 84 (09-15-23 @ 04:22) (68 - 84)  BP: 160/67 (09-14-23 @ 20:59) (160/67 - 184/72)  RR: 18 (09-15-23 @ 04:22) (17 - 18)  SpO2: 97% (09-15-23 @ 04:22) (94% - 99%)  Wt(kg): --  Daily     Daily       Physical Exam:  Gen: NAD  HEENT: EOMI  CV: RRR, normal S1 + S2, no m/r/g  Lungs: CTAB  Abd: soft, non-tender  Ext: No edema    Telemetry:    Laboratory Data:                        7.4    6.17  )-----------( 183      ( 15 Sep 2023 06:47 )             25.1     09-15    141  |  108  |  19  ----------------------------<  103<H>  4.0   |  23  |  1.15    Ca    9.5      15 Sep 2023 06:44    TPro  7.3  /  Alb  3.2<L>  /  TBili  0.6  /  DBili  x   /  AST  44<H>  /  ALT  21  /  AlkPhos  185<H>  09-14    PT/INR - ( 14 Sep 2023 05:40 )   PT: 11.6 sec;   INR: 1.06 ratio           CARDIAC MARKERS ( 14 Sep 2023 04:13 )  x     / x     / 166 U/L / x     / 2.9 ng/mL          Inpatient Medications:  MEDICATIONS  (STANDING):  aspirin enteric coated 81 milliGRAM(s) Oral daily  atorvastatin 80 milliGRAM(s) Oral at bedtime  dextrose 5%. 1000 milliLiter(s) (100 mL/Hr) IV Continuous <Continuous>  dextrose 5%. 1000 milliLiter(s) (50 mL/Hr) IV Continuous <Continuous>  dextrose 50% Injectable 25 Gram(s) IV Push once  dextrose 50% Injectable 25 Gram(s) IV Push once  dextrose 50% Injectable 12.5 Gram(s) IV Push once  glucagon  Injectable 1 milliGRAM(s) IntraMuscular once  heparin   Injectable 5000 Unit(s) SubCutaneous every 12 hours  influenza  Vaccine (HIGH DOSE) 0.7 milliLiter(s) IntraMuscular once  insulin lispro (ADMELOG) corrective regimen sliding scale   SubCutaneous three times a day before meals  insulin lispro (ADMELOG) corrective regimen sliding scale   SubCutaneous at bedtime  losartan 100 milliGRAM(s) Oral daily  pantoprazole    Tablet 40 milliGRAM(s) Oral before breakfast  senna 2 Tablet(s) Oral at bedtime      Assessment:  78y F pmh htn, hld, dm, p/w  2-3 days of substernal non- radiating CP, SOB, and progressive CHEATHAM x past few weeks    Recs:  cardiac stable  no e/o acs by ecg or biomarkers  elevated d-dimer --> obtain CTA chest and lower ext venous duplex  cardiac cath if cta neg for PE  vol status acceptabe, cxr clear, bnp level normal for age = start hctz 12.5mg for improved bp control and e/o elevated LA-P on echo  s/p echo = normal lv/rv fxn, + diastolic dysfunction  will follow          Over 35 minutes spent on total encounter; more than 50% of the visit was spent counseling and/or coordinating care by the attending physician.      Beny Pryor MD   Cardiovascular Disease  [phone]

## 2023-09-15 NOTE — DIETITIAN INITIAL EVALUATION ADULT - FACTORS AFF FOOD INTAKE
Pt reported lack of appetite prior to admission, however stated appetite has been improving in-house./persistent lack of appetite

## 2023-09-15 NOTE — PROGRESS NOTE ADULT - SUBJECTIVE AND OBJECTIVE BOX
Patient is a 78y old  Female who presents with a chief complaint of Chest pain.  Per chart, "78y F pmh htn, hld, dm, p/w  2-3 days of substernal non- radiating CP, SOB, and progressive CHEATHAM x past few weeks."   (15 Sep 2023 16:29)      SUBJECTIVE / OVERNIGHT EVENTS: c/o L hip pain  Review of Systems  chest pain no  palpitations no  sob no  nausea no  headache no    MEDICATIONS  (STANDING):  ascorbic acid 500 milliGRAM(s) Oral daily  aspirin enteric coated 81 milliGRAM(s) Oral daily  atorvastatin 80 milliGRAM(s) Oral at bedtime  chlorhexidine 2% Cloths 1 Application(s) Topical <User Schedule>  dextrose 5%. 1000 milliLiter(s) (50 mL/Hr) IV Continuous <Continuous>  dextrose 5%. 1000 milliLiter(s) (100 mL/Hr) IV Continuous <Continuous>  dextrose 50% Injectable 25 Gram(s) IV Push once  dextrose 50% Injectable 25 Gram(s) IV Push once  dextrose 50% Injectable 12.5 Gram(s) IV Push once  glucagon  Injectable 1 milliGRAM(s) IntraMuscular once  heparin   Injectable 5000 Unit(s) SubCutaneous every 12 hours  hydrochlorothiazide 12.5 milliGRAM(s) Oral daily  influenza  Vaccine (HIGH DOSE) 0.7 milliLiter(s) IntraMuscular once  insulin lispro (ADMELOG) corrective regimen sliding scale   SubCutaneous three times a day before meals  insulin lispro (ADMELOG) corrective regimen sliding scale   SubCutaneous at bedtime  losartan 100 milliGRAM(s) Oral daily  multivitamin 1 Tablet(s) Oral daily  pantoprazole    Tablet 40 milliGRAM(s) Oral before breakfast  senna 2 Tablet(s) Oral at bedtime    MEDICATIONS  (PRN):  acetaminophen     Tablet .. 650 milliGRAM(s) Oral every 6 hours PRN Temp greater or equal to 38C (100.4F), Mild Pain (1 - 3)  aluminum hydroxide/magnesium hydroxide/simethicone Suspension 30 milliLiter(s) Oral every 4 hours PRN Dyspepsia  dextrose Oral Gel 15 Gram(s) Oral once PRN Blood Glucose LESS THAN 70 milliGRAM(s)/deciliter  melatonin 3 milliGRAM(s) Oral at bedtime PRN Insomnia  ondansetron Injectable 4 milliGRAM(s) IV Push every 8 hours PRN Nausea and/or Vomiting      Vital Signs Last 24 Hrs  T(C): 36.6 (15 Sep 2023 16:38), Max: 36.8 (14 Sep 2023 20:59)  T(F): 97.8 (15 Sep 2023 16:38), Max: 98.2 (14 Sep 2023 20:59)  HR: 87 (15 Sep 2023 16:38) (68 - 87)  BP: 169/82 (15 Sep 2023 16:38) (160/67 - 169/82)  BP(mean): --  RR: 18 (15 Sep 2023 16:38) (18 - 18)  SpO2: 97% (15 Sep 2023 16:38) (95% - 97%)    Parameters below as of 15 Sep 2023 16:38  Patient On (Oxygen Delivery Method): room air        PHYSICAL EXAM:  GENERAL: NAD, well-developed  HEAD:  Atraumatic, Normocephalic  EYES: EOMI, PERRLA, conjunctiva and sclera clear  NECK: Supple, No JVD  CHEST/LUNG: Clear to auscultation bilaterally; No wheeze  HEART: Regular rate and rhythm; No murmurs, rubs, or gallops  ABDOMEN: Soft, Nontender, Nondistended; Bowel sounds present  EXTREMITIES:  2+ Peripheral Pulses, No clubbing, cyanosis, or edema  PSYCH: AAOx3  NEUROLOGY: non-focal  SKIN: No rashes or lesions    LABS:                        7.4    6.17  )-----------( 183      ( 15 Sep 2023 06:47 )             25.1     09-15    141  |  108  |  19  ----------------------------<  103<H>  4.0   |  23  |  1.15    Ca    9.5      15 Sep 2023 06:44    TPro  7.3  /  Alb  3.2<L>  /  TBili  0.6  /  DBili  x   /  AST  44<H>  /  ALT  21  /  AlkPhos  185<H>  09-14    PT/INR - ( 14 Sep 2023 05:40 )   PT: 11.6 sec;   INR: 1.06 ratio           CARDIAC MARKERS ( 14 Sep 2023 04:13 )  x     / x     / 166 U/L / x     / 2.9 ng/mL      Urinalysis Basic - ( 15 Sep 2023 06:44 )    Color: x / Appearance: x / SG: x / pH: x  Gluc: 103 mg/dL / Ketone: x  / Bili: x / Urobili: x   Blood: x / Protein: x / Nitrite: x   Leuk Esterase: x / RBC: x / WBC x   Sq Epi: x / Non Sq Epi: x / Bacteria: x          RADIOLOGY & ADDITIONAL TESTS:    Imaging Personally Reviewed:  < from: Xray Hip 2-3 Views, Left (09.15.23 @ 15:19) >  IMPRESSION:  No fractures or dislocations.    Preserved left hip joint space and no gross radiographic evidence for AVN.    Chronic mild enthesopathic change along greater trochanter margin.    Generalized osteopenia. Otherwise no discrete suspicious lytic or blastic   lesions.    Bladder distended with residual contrast from earlier same day   contrast-enhanced chest CT .    < end of copied text >  < from: VA Duplex Lower Ext Vein Scan, Bilat (09.15.23 @ 08:45) >    IMPRESSION:  No evidence of deep venous thrombosis in either lower extremity.    < end of copied text >  < from: CT Angio Chest PE Protocol w/ IV Cont (09.15.23 @ 08:40) >  IMPRESSION:    No pulmonary embolus.    < end of copied text >    Consultant(s) Notes Reviewed:      Care Discussed with Consultants/Other Providers:

## 2023-09-15 NOTE — DIETITIAN INITIAL EVALUATION ADULT - REASON FOR ADMISSION
Chest pain.  Per chart, "78y F pmh htn, hld, dm, p/w  2-3 days of substernal non- radiating CP, SOB, and progressive CHEATHAM x past few weeks."

## 2023-09-15 NOTE — DIETITIAN INITIAL EVALUATION ADULT - NS FNS REASON FOR WEIGHT CHANG
Pt stated she is unsure of the reasoning behind recent weight loss. Possibly because of decreased PO intake.

## 2023-09-15 NOTE — DIETITIAN INITIAL EVALUATION ADULT - NUTRITIONGOAL OUTCOME2
Pt will be able to adhere to nutrition-related guidelines and have gradual weight loss towards IBW while meeting estimated protein-energy needs.

## 2023-09-15 NOTE — DIETITIAN INITIAL EVALUATION ADULT - PERTINENT MEDS FT
MEDICATIONS  (STANDING):  aspirin enteric coated 81 milliGRAM(s) Oral daily  atorvastatin 80 milliGRAM(s) Oral at bedtime  chlorhexidine 2% Cloths 1 Application(s) Topical <User Schedule>  dextrose 5%. 1000 milliLiter(s) (50 mL/Hr) IV Continuous <Continuous>  dextrose 5%. 1000 milliLiter(s) (100 mL/Hr) IV Continuous <Continuous>  dextrose 50% Injectable 25 Gram(s) IV Push once  dextrose 50% Injectable 25 Gram(s) IV Push once  dextrose 50% Injectable 12.5 Gram(s) IV Push once  glucagon  Injectable 1 milliGRAM(s) IntraMuscular once  heparin   Injectable 5000 Unit(s) SubCutaneous every 12 hours  hydrochlorothiazide 12.5 milliGRAM(s) Oral daily  influenza  Vaccine (HIGH DOSE) 0.7 milliLiter(s) IntraMuscular once  insulin lispro (ADMELOG) corrective regimen sliding scale   SubCutaneous three times a day before meals  insulin lispro (ADMELOG) corrective regimen sliding scale   SubCutaneous at bedtime  losartan 100 milliGRAM(s) Oral daily  pantoprazole    Tablet 40 milliGRAM(s) Oral before breakfast  senna 2 Tablet(s) Oral at bedtime    MEDICATIONS  (PRN):  acetaminophen     Tablet .. 650 milliGRAM(s) Oral every 6 hours PRN Temp greater or equal to 38C (100.4F), Mild Pain (1 - 3)  aluminum hydroxide/magnesium hydroxide/simethicone Suspension 30 milliLiter(s) Oral every 4 hours PRN Dyspepsia  dextrose Oral Gel 15 Gram(s) Oral once PRN Blood Glucose LESS THAN 70 milliGRAM(s)/deciliter  melatonin 3 milliGRAM(s) Oral at bedtime PRN Insomnia  ondansetron Injectable 4 milliGRAM(s) IV Push every 8 hours PRN Nausea and/or Vomiting

## 2023-09-15 NOTE — DIETITIAN INITIAL EVALUATION ADULT - REASON INDICATOR FOR ASSESSMENT
RD consult warranted for DTI R Buttock, Pressure Injury Stage 2 or >   Source: Patient, Electronic Medical Record, Interpretation Services (ID#[id number] - Alison)  Chart reviewed, events noted.  RD consult warranted for DTI R Buttock, Pressure Injury Stage 2 or >   Source: Patient, Electronic Medical Record, Interpretation Services (ID#[id number] - Alison)  Chart reviewed, events noted.  RD consult warranted for DTI R Buttock, Pressure Injury Stage 2 or >   Source: Patient, Electronic Medical Record, Interpretation Services (ID#[id number] - Alison)  Chart reviewed, events noted.

## 2023-09-16 LAB
ANION GAP SERPL CALC-SCNC: 10 MMOL/L — SIGNIFICANT CHANGE UP (ref 5–17)
BUN SERPL-MCNC: 23 MG/DL — SIGNIFICANT CHANGE UP (ref 7–23)
CALCIUM SERPL-MCNC: 10 MG/DL — SIGNIFICANT CHANGE UP (ref 8.4–10.5)
CHLORIDE SERPL-SCNC: 104 MMOL/L — SIGNIFICANT CHANGE UP (ref 96–108)
CO2 SERPL-SCNC: 23 MMOL/L — SIGNIFICANT CHANGE UP (ref 22–31)
CREAT SERPL-MCNC: 1.33 MG/DL — HIGH (ref 0.5–1.3)
EGFR: 41 ML/MIN/1.73M2 — LOW
GLUCOSE BLDC GLUCOMTR-MCNC: 117 MG/DL — HIGH (ref 70–99)
GLUCOSE BLDC GLUCOMTR-MCNC: 129 MG/DL — HIGH (ref 70–99)
GLUCOSE BLDC GLUCOMTR-MCNC: 130 MG/DL — HIGH (ref 70–99)
GLUCOSE BLDC GLUCOMTR-MCNC: 142 MG/DL — HIGH (ref 70–99)
GLUCOSE SERPL-MCNC: 119 MG/DL — HIGH (ref 70–99)
HCT VFR BLD CALC: 26.2 % — LOW (ref 34.5–45)
HGB BLD-MCNC: 7.7 G/DL — LOW (ref 11.5–15.5)
MCHC RBC-ENTMCNC: 24.7 PG — LOW (ref 27–34)
MCHC RBC-ENTMCNC: 29.4 GM/DL — LOW (ref 32–36)
MCV RBC AUTO: 84 FL — SIGNIFICANT CHANGE UP (ref 80–100)
MRSA PCR RESULT.: SIGNIFICANT CHANGE UP
NRBC # BLD: 0 /100 WBCS — SIGNIFICANT CHANGE UP (ref 0–0)
PLATELET # BLD AUTO: 181 K/UL — SIGNIFICANT CHANGE UP (ref 150–400)
POTASSIUM SERPL-MCNC: 4.1 MMOL/L — SIGNIFICANT CHANGE UP (ref 3.5–5.3)
POTASSIUM SERPL-SCNC: 4.1 MMOL/L — SIGNIFICANT CHANGE UP (ref 3.5–5.3)
RBC # BLD: 3.12 M/UL — LOW (ref 3.8–5.2)
RBC # FLD: 17.8 % — HIGH (ref 10.3–14.5)
S AUREUS DNA NOSE QL NAA+PROBE: DETECTED
SODIUM SERPL-SCNC: 137 MMOL/L — SIGNIFICANT CHANGE UP (ref 135–145)
WBC # BLD: 5.56 K/UL — SIGNIFICANT CHANGE UP (ref 3.8–10.5)
WBC # FLD AUTO: 5.56 K/UL — SIGNIFICANT CHANGE UP (ref 3.8–10.5)

## 2023-09-16 RX ADMIN — CHLORHEXIDINE GLUCONATE 1 APPLICATION(S): 213 SOLUTION TOPICAL at 06:57

## 2023-09-16 RX ADMIN — Medication 81 MILLIGRAM(S): at 11:44

## 2023-09-16 RX ADMIN — HEPARIN SODIUM 5000 UNIT(S): 5000 INJECTION INTRAVENOUS; SUBCUTANEOUS at 05:35

## 2023-09-16 RX ADMIN — PANTOPRAZOLE SODIUM 40 MILLIGRAM(S): 20 TABLET, DELAYED RELEASE ORAL at 05:36

## 2023-09-16 RX ADMIN — Medication 500 MILLIGRAM(S): at 11:43

## 2023-09-16 RX ADMIN — HEPARIN SODIUM 5000 UNIT(S): 5000 INJECTION INTRAVENOUS; SUBCUTANEOUS at 17:44

## 2023-09-16 RX ADMIN — Medication 1 TABLET(S): at 11:42

## 2023-09-16 RX ADMIN — ATORVASTATIN CALCIUM 80 MILLIGRAM(S): 80 TABLET, FILM COATED ORAL at 22:03

## 2023-09-16 RX ADMIN — SENNA PLUS 2 TABLET(S): 8.6 TABLET ORAL at 22:03

## 2023-09-16 RX ADMIN — LOSARTAN POTASSIUM 100 MILLIGRAM(S): 100 TABLET, FILM COATED ORAL at 05:36

## 2023-09-16 NOTE — CONSULT NOTE ADULT - SUBJECTIVE AND OBJECTIVE BOX
Chief Complaint:  Patient is a 78y old  Female who presents with a chief complaint of CP. CHEATHAM   History of Present Illness:   78y F pmh htn, hld, dm, p/w  2-3 days of substernal non- radiating CP, SOB, and progressive CHEATHAM x past few weeks. Now only able to tolerate 10-15 feet before becoming winded. No orthopnea. Also report worsening b/l LE pitting edema. Was eval by PCP who was concerned w test results and referred pt to Cardio-- Dr. Beny Garber,  who sent Pt  to ED for likely stress test/echo, +/- cath.     ROS: Denies palpitation, N/V/D, fever, cough, chills, dizziness, abm pain, recent travel, sick contact, change in bowel and urinary habits     A 10-system ROS was performed and is negative except as noted above and/or in the HPI.   Review of Systems:  Other Review of Systems: All other review of systems negative, except as noted in HPI  no melena no brbpr    Allergies:  No Known Allergies      Medications:  acetaminophen     Tablet .. 650 milliGRAM(s) Oral every 6 hours PRN  aluminum hydroxide/magnesium hydroxide/simethicone Suspension 30 milliLiter(s) Oral every 4 hours PRN  ascorbic acid 500 milliGRAM(s) Oral daily  aspirin enteric coated 81 milliGRAM(s) Oral daily  atorvastatin 80 milliGRAM(s) Oral at bedtime  chlorhexidine 2% Cloths 1 Application(s) Topical <User Schedule>  dextrose 5%. 1000 milliLiter(s) IV Continuous <Continuous>  dextrose 5%. 1000 milliLiter(s) IV Continuous <Continuous>  dextrose 50% Injectable 25 Gram(s) IV Push once  dextrose 50% Injectable 25 Gram(s) IV Push once  dextrose 50% Injectable 12.5 Gram(s) IV Push once  dextrose Oral Gel 15 Gram(s) Oral once PRN  glucagon  Injectable 1 milliGRAM(s) IntraMuscular once  heparin   Injectable 5000 Unit(s) SubCutaneous every 12 hours  hydrochlorothiazide 12.5 milliGRAM(s) Oral daily  influenza  Vaccine (HIGH DOSE) 0.7 milliLiter(s) IntraMuscular once  insulin lispro (ADMELOG) corrective regimen sliding scale   SubCutaneous three times a day before meals  insulin lispro (ADMELOG) corrective regimen sliding scale   SubCutaneous at bedtime  losartan 100 milliGRAM(s) Oral daily  melatonin 3 milliGRAM(s) Oral at bedtime PRN  multivitamin 1 Tablet(s) Oral daily  ondansetron Injectable 4 milliGRAM(s) IV Push every 8 hours PRN  pantoprazole    Tablet 40 milliGRAM(s) Oral before breakfast  senna 2 Tablet(s) Oral at bedtime      PMHX/PSHX:  HLD (hyperlipidemia)    DM (diabetes mellitus)    Osteoarthritis    Mild HTN    HTN (hypertension)    History of diverticulitis    HTN (hypertension)    History of bowel resection        Family history:      Social History:     ROS:     General:  No wt loss, fevers, chills, night sweats, fatigue,   Eyes:  Good vision, no reported pain  ENT:  No sore throat, pain, runny nose, dysphagia  CV:  No pain, palpitations, hypo/hypertension  Resp:  No dyspnea, cough, tachypnea, wheezing  GI:  No pain, No nausea, No vomiting, No diarrhea, No constipation, No weight loss, No fever, No pruritis, No rectal bleeding, No tarry stools, No dysphagia,  :  No pain, bleeding, incontinence, nocturia  Muscle:  No pain, weakness  Neuro:  No weakness, tingling, memory problems  Psych:  No fatigue, insomnia, mood problems, depression  Endocrine:  No polyuria, polydipsia, cold/heat intolerance  Heme:  No petechiae, ecchymosis, easy bruisability  Skin:  No rash, tattoos, scars, edema      PHYSICAL EXAM:   Vital Signs:  Vital Signs Last 24 Hrs  T(C): 36.7 (16 Sep 2023 04:18), Max: 36.8 (15 Sep 2023 20:59)  T(F): 98.1 (16 Sep 2023 04:18), Max: 98.3 (15 Sep 2023 20:59)  HR: 63 (16 Sep 2023 04:18) (63 - 87)  BP: 138/56 (16 Sep 2023 04:18) (138/56 - 169/82)  BP(mean): --  RR: 18 (16 Sep 2023 04:18) (18 - 18)  SpO2: 97% (16 Sep 2023 04:18) (95% - 97%)    Parameters below as of 16 Sep 2023 04:18  Patient On (Oxygen Delivery Method): room air      Daily     Daily     GENERAL:  Appears stated age, well-groomed, well-nourished, no distress  HEENT:  NC/AT,  conjunctivae clear and pink, no thyromegaly, nodules, adenopathy, no JVD, sclera -anicteric  CHEST:  Full & symmetric excursion, no increased effort, breath sounds clear  HEART:  Regular rhythm, S1, S2, no murmur/rub/S3/S4, no abdominal bruit, no edema  ABDOMEN:  Soft, non-tender, non-distended, normoactive bowel sounds,  no masses ,no hepato-splenomegaly, no signs of chronic liver disease  EXTEREMITIES:  no cyanosis,clubbing or edema  SKIN:  No rash/erythema/ecchymoses/petechiae/wounds/abscess/warm/dry  NEURO:  Alert, oriented, no asterixis, no tremor, no encephalopathy    LABS:                        7.4    6.17  )-----------( 183      ( 15 Sep 2023 06:47 )             25.1     09-15    141  |  108  |  19  ----------------------------<  103<H>  4.0   |  23  |  1.15    Ca    9.5      15 Sep 2023 06:44          Urinalysis Basic - ( 15 Sep 2023 06:44 )    Color: x / Appearance: x / SG: x / pH: x  Gluc: 103 mg/dL / Ketone: x  / Bili: x / Urobili: x   Blood: x / Protein: x / Nitrite: x   Leuk Esterase: x / RBC: x / WBC x   Sq Epi: x / Non Sq Epi: x / Bacteria: x          Imaging:

## 2023-09-16 NOTE — PROGRESS NOTE ADULT - SUBJECTIVE AND OBJECTIVE BOX
Patient is a 78y old  Female who presents with a chief complaint of CP. CHEATHAM (16 Sep 2023 09:28)      SUBJECTIVE / OVERNIGHT EVENTS: Comfortable without new complaints.   Review of Systems  chest pain no  palpitations no  sob no  nausea no  headache no    MEDICATIONS  (STANDING):  ascorbic acid 500 milliGRAM(s) Oral daily  aspirin enteric coated 81 milliGRAM(s) Oral daily  atorvastatin 80 milliGRAM(s) Oral at bedtime  chlorhexidine 2% Cloths 1 Application(s) Topical <User Schedule>  dextrose 5%. 1000 milliLiter(s) (50 mL/Hr) IV Continuous <Continuous>  dextrose 5%. 1000 milliLiter(s) (100 mL/Hr) IV Continuous <Continuous>  dextrose 50% Injectable 25 Gram(s) IV Push once  dextrose 50% Injectable 25 Gram(s) IV Push once  dextrose 50% Injectable 12.5 Gram(s) IV Push once  glucagon  Injectable 1 milliGRAM(s) IntraMuscular once  heparin   Injectable 5000 Unit(s) SubCutaneous every 12 hours  hydrochlorothiazide 12.5 milliGRAM(s) Oral daily  influenza  Vaccine (HIGH DOSE) 0.7 milliLiter(s) IntraMuscular once  insulin lispro (ADMELOG) corrective regimen sliding scale   SubCutaneous three times a day before meals  insulin lispro (ADMELOG) corrective regimen sliding scale   SubCutaneous at bedtime  losartan 100 milliGRAM(s) Oral daily  multivitamin 1 Tablet(s) Oral daily  pantoprazole    Tablet 40 milliGRAM(s) Oral before breakfast  senna 2 Tablet(s) Oral at bedtime    MEDICATIONS  (PRN):  acetaminophen     Tablet .. 650 milliGRAM(s) Oral every 6 hours PRN Temp greater or equal to 38C (100.4F), Mild Pain (1 - 3)  aluminum hydroxide/magnesium hydroxide/simethicone Suspension 30 milliLiter(s) Oral every 4 hours PRN Dyspepsia  dextrose Oral Gel 15 Gram(s) Oral once PRN Blood Glucose LESS THAN 70 milliGRAM(s)/deciliter  melatonin 3 milliGRAM(s) Oral at bedtime PRN Insomnia  ondansetron Injectable 4 milliGRAM(s) IV Push every 8 hours PRN Nausea and/or Vomiting      Vital Signs Last 24 Hrs  T(C): 36.5 (16 Sep 2023 11:23), Max: 36.8 (15 Sep 2023 20:59)  T(F): 97.7 (16 Sep 2023 11:23), Max: 98.3 (15 Sep 2023 20:59)  HR: 67 (16 Sep 2023 11:23) (63 - 87)  BP: 163/71 (16 Sep 2023 11:23) (138/56 - 169/82)  BP(mean): --  RR: 18 (16 Sep 2023 11:23) (18 - 18)  SpO2: 99% (16 Sep 2023 11:23) (96% - 99%)    Parameters below as of 16 Sep 2023 11:23  Patient On (Oxygen Delivery Method): room air        PHYSICAL EXAM:  GENERAL: NAD, well-developed  HEAD:  Atraumatic, Normocephalic  EYES: EOMI, PERRLA, conjunctiva and sclera clear  NECK: Supple, No JVD  CHEST/LUNG: Clear to auscultation bilaterally; No wheeze  HEART: Regular rate and rhythm; No murmurs, rubs, or gallops  ABDOMEN: Soft, Nontender, Nondistended; Bowel sounds present  EXTREMITIES:  2+ Peripheral Pulses, No clubbing, cyanosis, or edema  PSYCH: AAOx3  NEUROLOGY: non-focal  SKIN: No rashes or lesions    LABS:                        7.7    5.56  )-----------( 181      ( 16 Sep 2023 11:17 )             26.2     09-16    137  |  104  |  23  ----------------------------<  119<H>  4.1   |  23  |  1.33<H>    Ca    10.0      16 Sep 2023 11:17            Urinalysis Basic - ( 16 Sep 2023 11:17 )    Color: x / Appearance: x / SG: x / pH: x  Gluc: 119 mg/dL / Ketone: x  / Bili: x / Urobili: x   Blood: x / Protein: x / Nitrite: x   Leuk Esterase: x / RBC: x / WBC x   Sq Epi: x / Non Sq Epi: x / Bacteria: x          RADIOLOGY & ADDITIONAL TESTS:    Imaging Personally Reviewed:    Consultant(s) Notes Reviewed:      Care Discussed with Consultants/Other Providers:

## 2023-09-16 NOTE — PROGRESS NOTE ADULT - SUBJECTIVE AND OBJECTIVE BOX
Cardiovascular Disease Progress Note    Overnight events: No acute events overnight.  no new cardiac sx  Otherwise review of systems negative    Objective Findings:  T(C): 36.7 (09-16-23 @ 04:18), Max: 36.8 (09-15-23 @ 20:59)  HR: 63 (09-16-23 @ 04:18) (63 - 87)  BP: 138/56 (09-16-23 @ 04:18) (138/56 - 169/82)  RR: 18 (09-16-23 @ 04:18) (18 - 18)  SpO2: 97% (09-16-23 @ 04:18) (95% - 97%)  Wt(kg): --  Daily     Daily       Physical Exam:  Gen: NAD  HEENT: EOMI  CV: RRR, normal S1 + S2, no m/r/g  Lungs: CTAB  Abd: soft, non-tender  Ext: No edema    Telemetry:    Laboratory Data:                        7.4    6.17  )-----------( 183      ( 15 Sep 2023 06:47 )             25.1     09-15    141  |  108  |  19  ----------------------------<  103<H>  4.0   |  23  |  1.15    Ca    9.5      15 Sep 2023 06:44                Inpatient Medications:  MEDICATIONS  (STANDING):  ascorbic acid 500 milliGRAM(s) Oral daily  aspirin enteric coated 81 milliGRAM(s) Oral daily  atorvastatin 80 milliGRAM(s) Oral at bedtime  chlorhexidine 2% Cloths 1 Application(s) Topical <User Schedule>  dextrose 5%. 1000 milliLiter(s) (50 mL/Hr) IV Continuous <Continuous>  dextrose 5%. 1000 milliLiter(s) (100 mL/Hr) IV Continuous <Continuous>  dextrose 50% Injectable 25 Gram(s) IV Push once  dextrose 50% Injectable 25 Gram(s) IV Push once  dextrose 50% Injectable 12.5 Gram(s) IV Push once  glucagon  Injectable 1 milliGRAM(s) IntraMuscular once  heparin   Injectable 5000 Unit(s) SubCutaneous every 12 hours  hydrochlorothiazide 12.5 milliGRAM(s) Oral daily  influenza  Vaccine (HIGH DOSE) 0.7 milliLiter(s) IntraMuscular once  insulin lispro (ADMELOG) corrective regimen sliding scale   SubCutaneous three times a day before meals  insulin lispro (ADMELOG) corrective regimen sliding scale   SubCutaneous at bedtime  losartan 100 milliGRAM(s) Oral daily  multivitamin 1 Tablet(s) Oral daily  pantoprazole    Tablet 40 milliGRAM(s) Oral before breakfast  senna 2 Tablet(s) Oral at bedtime      Assessment:  78y F pmh htn, hld, dm, p/w  2-3 days of substernal non- radiating CP, SOB, and progressive CHEATHAM x past few weeks    Recs:  cardiac stable  no e/o acs by ecg or biomarkers  elevated d-dimer --> CTA chest neg for PE and lower ext venous duplex neg for DVT  cardiac cath pending anemia workup  vol status acceptabe, cxr clear, bnp level normal for age = cw hctz 12.5mg for improved bp control and e/o elevated LA-P on echo  s/p echo = normal lv/rv fxn, + diastolic dysfunction  will follow      Over 25 minutes spent on total encounter; more than 50% of the visit was spent counseling and/or coordinating care by the attending physician.      Beny Pryor MD   Cardiovascular Disease  [phone] Cardiovascular Disease Progress Note    Overnight events: No acute events overnight.  no new cardiac sx  Otherwise review of systems negative    Objective Findings:  T(C): 36.7 (09-16-23 @ 04:18), Max: 36.8 (09-15-23 @ 20:59)  HR: 63 (09-16-23 @ 04:18) (63 - 87)  BP: 138/56 (09-16-23 @ 04:18) (138/56 - 169/82)  RR: 18 (09-16-23 @ 04:18) (18 - 18)  SpO2: 97% (09-16-23 @ 04:18) (95% - 97%)  Wt(kg): --  Daily     Daily       Physical Exam:  Gen: NAD  HEENT: EOMI  CV: RRR, normal S1 + S2, no m/r/g  Lungs: CTAB  Abd: soft, non-tender  Ext: No edema    Telemetry:    Laboratory Data:                        7.4    6.17  )-----------( 183      ( 15 Sep 2023 06:47 )             25.1     09-15    141  |  108  |  19  ----------------------------<  103<H>  4.0   |  23  |  1.15    Ca    9.5      15 Sep 2023 06:44                Inpatient Medications:  MEDICATIONS  (STANDING):  ascorbic acid 500 milliGRAM(s) Oral daily  aspirin enteric coated 81 milliGRAM(s) Oral daily  atorvastatin 80 milliGRAM(s) Oral at bedtime  chlorhexidine 2% Cloths 1 Application(s) Topical <User Schedule>  dextrose 5%. 1000 milliLiter(s) (50 mL/Hr) IV Continuous <Continuous>  dextrose 5%. 1000 milliLiter(s) (100 mL/Hr) IV Continuous <Continuous>  dextrose 50% Injectable 25 Gram(s) IV Push once  dextrose 50% Injectable 25 Gram(s) IV Push once  dextrose 50% Injectable 12.5 Gram(s) IV Push once  glucagon  Injectable 1 milliGRAM(s) IntraMuscular once  heparin   Injectable 5000 Unit(s) SubCutaneous every 12 hours  hydrochlorothiazide 12.5 milliGRAM(s) Oral daily  influenza  Vaccine (HIGH DOSE) 0.7 milliLiter(s) IntraMuscular once  insulin lispro (ADMELOG) corrective regimen sliding scale   SubCutaneous three times a day before meals  insulin lispro (ADMELOG) corrective regimen sliding scale   SubCutaneous at bedtime  losartan 100 milliGRAM(s) Oral daily  multivitamin 1 Tablet(s) Oral daily  pantoprazole    Tablet 40 milliGRAM(s) Oral before breakfast  senna 2 Tablet(s) Oral at bedtime      Assessment:  78y F pmh htn, hld, dm, p/w  2-3 days of substernal non- radiating CP, SOB, and progressive CHEATHAM x past few weeks    Recs:  cardiac stable  no e/o acs by ecg or biomarkers  elevated d-dimer --> CTA chest neg for PE and lower ext venous duplex neg for DVT  cardiac cath pending anemia workup  vol status acceptabe, cxr clear, bnp level normal for age = cw hctz 12.5mg for improved bp control and e/o elevated LA-P on echo  s/p echo = normal lv/rv fxn, + diastolic dysfunction  will follow      Over 25 minutes spent on total encounter; more than 50% of the visit was spent counseling and/or coordinating care by the attending physician.      Beny Pryor MD   Cardiovascular Disease  [phone] Cardiovascular Disease Progress Note    Overnight events: No acute events overnight.  no new cardiac sx  Otherwise review of systems negative    Objective Findings:  T(C): 36.7 (09-16-23 @ 04:18), Max: 36.8 (09-15-23 @ 20:59)  HR: 63 (09-16-23 @ 04:18) (63 - 87)  BP: 138/56 (09-16-23 @ 04:18) (138/56 - 169/82)  RR: 18 (09-16-23 @ 04:18) (18 - 18)  SpO2: 97% (09-16-23 @ 04:18) (95% - 97%)  Wt(kg): --  Daily     Daily       Physical Exam:  Gen: NAD  HEENT: EOMI  CV: RRR, normal S1 + S2, no m/r/g  Lungs: CTAB  Abd: soft, non-tender  Ext: No edema    Telemetry:    Laboratory Data:                        7.4    6.17  )-----------( 183      ( 15 Sep 2023 06:47 )             25.1     09-15    141  |  108  |  19  ----------------------------<  103<H>  4.0   |  23  |  1.15    Ca    9.5      15 Sep 2023 06:44                Inpatient Medications:  MEDICATIONS  (STANDING):  ascorbic acid 500 milliGRAM(s) Oral daily  aspirin enteric coated 81 milliGRAM(s) Oral daily  atorvastatin 80 milliGRAM(s) Oral at bedtime  chlorhexidine 2% Cloths 1 Application(s) Topical <User Schedule>  dextrose 5%. 1000 milliLiter(s) (50 mL/Hr) IV Continuous <Continuous>  dextrose 5%. 1000 milliLiter(s) (100 mL/Hr) IV Continuous <Continuous>  dextrose 50% Injectable 25 Gram(s) IV Push once  dextrose 50% Injectable 25 Gram(s) IV Push once  dextrose 50% Injectable 12.5 Gram(s) IV Push once  glucagon  Injectable 1 milliGRAM(s) IntraMuscular once  heparin   Injectable 5000 Unit(s) SubCutaneous every 12 hours  hydrochlorothiazide 12.5 milliGRAM(s) Oral daily  influenza  Vaccine (HIGH DOSE) 0.7 milliLiter(s) IntraMuscular once  insulin lispro (ADMELOG) corrective regimen sliding scale   SubCutaneous three times a day before meals  insulin lispro (ADMELOG) corrective regimen sliding scale   SubCutaneous at bedtime  losartan 100 milliGRAM(s) Oral daily  multivitamin 1 Tablet(s) Oral daily  pantoprazole    Tablet 40 milliGRAM(s) Oral before breakfast  senna 2 Tablet(s) Oral at bedtime      Assessment:  78y F pmh htn, hld, dm, p/w  2-3 days of substernal non- radiating CP, SOB, and progressive CHEATHAM x past few weeks    Recs:  cardiac stable  no e/o acs by ecg or biomarkers  elevated d-dimer --> CTA chest neg for PE and lower ext venous duplex neg for DVT  cardiac cath pending anemia workup  vol status acceptabe, cxr clear, bnp level normal for age = cw hctz 12.5mg for improved bp control and e/o elevated LA-P on echo  s/p echo = normal lv/rv fxn, + diastolic dysfunction  will follow      Over 25 minutes spent on total encounter; more than 50% of the visit was spent counseling and/or coordinating care by the attending physician.      Beny Pryor MD   Cardiovascular Disease  [phone]

## 2023-09-16 NOTE — CONSULT NOTE ADULT - ASSESSMENT
anemia  gi bleed    plan  daily cbc   transfuse prn   consider hematology eval  check iron studies   ppi once a day  check stool occult blood  further recommendations pending above

## 2023-09-16 NOTE — PROGRESS NOTE ADULT - ASSESSMENT
78y F pmh htn, hld, dm, p/w  2-3 days of substernal non- radiating CP, SOB, and progressive CHEATHAM x past few weeks admitted for CP w/u    Angina pectoris.   - Obese pt with multiple risk factor ( htn, DM) present  for CP, sob  - Afebrile, VSS, clinically nontoxic   - EKG: NSR   - CXR neg   - Telemetry   - Echo   - Cath pending   - Cardio follow Beny Pryor    Anemia iron deficiency  - GI evaluation noted  - Guaiac      D Dimer elevated  - CTa no embolus  - LED no DVT    HTN (hypertension).   - losartan 100mg   - Monitor.    HLD (hyperlipidemia).   - c/w statin.    DM2 (diabetes mellitus, type 2).   - Hold home Metformin   - ISS ACHS  - Check FS, adjust insulin accordingly   - DASH/CC diet  - Check A1c.    Prophylactic measure.   - DVT ppx: Heparin sq  - GI ppx: PPI   - Diet: DASH/ CC.    Brock Randolph MD phone [phone]  78y F pmh htn, hld, dm, p/w  2-3 days of substernal non- radiating CP, SOB, and progressive CHEATHAM x past few weeks admitted for CP w/u    Angina pectoris.   - Obese pt with multiple risk factor ( htn, DM) present  for CP, sob  - Afebrile, VSS, clinically nontoxic   - EKG: NSR   - CXR neg   - Telemetry   - Echo   - Cath pending   - Cardio follow Beny Pryor    Anemia iron deficiency  - GI evaluation noted  - Guaiac      D Dimer elevated  - CTa no embolus  - LED no DVT    HTN (hypertension).   - losartan 100mg   - Monitor.    HLD (hyperlipidemia).   - c/w statin.    DM2 (diabetes mellitus, type 2).   - Hold home Metformin   - ISS ACHS  - Check FS, adjust insulin accordingly   - DASH/CC diet  - Check A1c.    Prophylactic measure.   - DVT ppx: Heparin sq  - GI ppx: PPI   - Diet: DASH/ CC.    Brock Randolph MD phone [phone]  78y F pmh htn, hld, dm, p/w  2-3 days of substernal non- radiating CP, SOB, and progressive CHEATHAM x past few weeks admitted for CP w/u    Angina pectoris.   - Obese pt with multiple risk factor ( htn, DM) present  for CP, sob  - Afebrile, VSS, clinically nontoxic   - EKG: NSR   - CXR neg   - Telemetry   - Echo   - Cath pending   - Cardio follow Beny Pryor    Anemia iron deficiency  - GI evaluation noted  - Guaiac      D Dimer elevated  - CTa no embolus  - LED no DVT    HTN (hypertension).   - losartan 100mg   - Monitor.    HLD (hyperlipidemia).   - c/w statin.    DM2 (diabetes mellitus, type 2).   - Hold home Metformin   - ISS ACHS  - Check FS, adjust insulin accordingly   - DASH/CC diet  - Check A1c.    Prophylactic measure.   - DVT ppx: Heparin sq  - GI ppx: PPI   - Diet: DASH/ CC.    Brock Randolph MD phone [phone]

## 2023-09-17 LAB
ANION GAP SERPL CALC-SCNC: 8 MMOL/L — SIGNIFICANT CHANGE UP (ref 5–17)
BUN SERPL-MCNC: 27 MG/DL — HIGH (ref 7–23)
CALCIUM SERPL-MCNC: 10.5 MG/DL — SIGNIFICANT CHANGE UP (ref 8.4–10.5)
CHLORIDE SERPL-SCNC: 105 MMOL/L — SIGNIFICANT CHANGE UP (ref 96–108)
CO2 SERPL-SCNC: 24 MMOL/L — SIGNIFICANT CHANGE UP (ref 22–31)
CREAT SERPL-MCNC: 1.22 MG/DL — SIGNIFICANT CHANGE UP (ref 0.5–1.3)
EGFR: 45 ML/MIN/1.73M2 — LOW
GLUCOSE BLDC GLUCOMTR-MCNC: 110 MG/DL — HIGH (ref 70–99)
GLUCOSE BLDC GLUCOMTR-MCNC: 118 MG/DL — HIGH (ref 70–99)
GLUCOSE BLDC GLUCOMTR-MCNC: 124 MG/DL — HIGH (ref 70–99)
GLUCOSE BLDC GLUCOMTR-MCNC: 137 MG/DL — HIGH (ref 70–99)
GLUCOSE SERPL-MCNC: 107 MG/DL — HIGH (ref 70–99)
HCT VFR BLD CALC: 24.4 % — LOW (ref 34.5–45)
HGB BLD-MCNC: 7.4 G/DL — LOW (ref 11.5–15.5)
MCHC RBC-ENTMCNC: 25 PG — LOW (ref 27–34)
MCHC RBC-ENTMCNC: 30.3 GM/DL — LOW (ref 32–36)
MCV RBC AUTO: 82.4 FL — SIGNIFICANT CHANGE UP (ref 80–100)
NRBC # BLD: 0 /100 WBCS — SIGNIFICANT CHANGE UP (ref 0–0)
OB PNL STL: NEGATIVE — SIGNIFICANT CHANGE UP
PLATELET # BLD AUTO: 178 K/UL — SIGNIFICANT CHANGE UP (ref 150–400)
POTASSIUM SERPL-MCNC: 3.9 MMOL/L — SIGNIFICANT CHANGE UP (ref 3.5–5.3)
POTASSIUM SERPL-SCNC: 3.9 MMOL/L — SIGNIFICANT CHANGE UP (ref 3.5–5.3)
RBC # BLD: 2.96 M/UL — LOW (ref 3.8–5.2)
RBC # FLD: 17.7 % — HIGH (ref 10.3–14.5)
SODIUM SERPL-SCNC: 137 MMOL/L — SIGNIFICANT CHANGE UP (ref 135–145)
WBC # BLD: 5.36 K/UL — SIGNIFICANT CHANGE UP (ref 3.8–10.5)
WBC # FLD AUTO: 5.36 K/UL — SIGNIFICANT CHANGE UP (ref 3.8–10.5)

## 2023-09-17 PROCEDURE — 72148 MRI LUMBAR SPINE W/O DYE: CPT | Mod: 26

## 2023-09-17 RX ORDER — FERROUS SULFATE 325(65) MG
325 TABLET ORAL DAILY
Refills: 0 | Status: DISCONTINUED | OUTPATIENT
Start: 2023-09-17 | End: 2023-09-21

## 2023-09-17 RX ADMIN — Medication 650 MILLIGRAM(S): at 17:30

## 2023-09-17 RX ADMIN — LOSARTAN POTASSIUM 100 MILLIGRAM(S): 100 TABLET, FILM COATED ORAL at 06:42

## 2023-09-17 RX ADMIN — HEPARIN SODIUM 5000 UNIT(S): 5000 INJECTION INTRAVENOUS; SUBCUTANEOUS at 06:43

## 2023-09-17 RX ADMIN — HEPARIN SODIUM 5000 UNIT(S): 5000 INJECTION INTRAVENOUS; SUBCUTANEOUS at 17:31

## 2023-09-17 RX ADMIN — Medication 650 MILLIGRAM(S): at 18:24

## 2023-09-17 RX ADMIN — Medication 81 MILLIGRAM(S): at 11:50

## 2023-09-17 RX ADMIN — ATORVASTATIN CALCIUM 80 MILLIGRAM(S): 80 TABLET, FILM COATED ORAL at 22:31

## 2023-09-17 RX ADMIN — Medication 325 MILLIGRAM(S): at 11:50

## 2023-09-17 RX ADMIN — Medication 1 TABLET(S): at 11:49

## 2023-09-17 RX ADMIN — Medication 650 MILLIGRAM(S): at 09:36

## 2023-09-17 RX ADMIN — Medication 500 MILLIGRAM(S): at 11:49

## 2023-09-17 RX ADMIN — CHLORHEXIDINE GLUCONATE 1 APPLICATION(S): 213 SOLUTION TOPICAL at 07:07

## 2023-09-17 RX ADMIN — Medication 650 MILLIGRAM(S): at 23:30

## 2023-09-17 RX ADMIN — Medication 650 MILLIGRAM(S): at 08:40

## 2023-09-17 RX ADMIN — PANTOPRAZOLE SODIUM 40 MILLIGRAM(S): 20 TABLET, DELAYED RELEASE ORAL at 06:42

## 2023-09-17 NOTE — PROGRESS NOTE ADULT - SUBJECTIVE AND OBJECTIVE BOX
INTERVAL HPI/OVERNIGHT EVENTS:    No new overnight event.  No N/V/D.  Tolerating diet.    Allergies    No Known Allergies    Intolerances          General:  No wt loss, fevers, chills, night sweats, fatigue,   Eyes:  Good vision, no reported pain  ENT:  No sore throat, pain, runny nose, dysphagia  CV:  No pain, palpitations, hypo/hypertension  Resp:  No dyspnea, cough, tachypnea, wheezing  GI:  No pain, No nausea, No vomiting, No diarrhea, No constipation, No weight loss, No fever, No pruritis, No rectal bleeding, No tarry stools, No dysphagia,  :  No pain, bleeding, incontinence, nocturia  Muscle:  No pain, weakness  Neuro:  No weakness, tingling, memory problems  Psych:  No fatigue, insomnia, mood problems, depression  Endocrine:  No polyuria, polydipsia, cold/heat intolerance  Heme:  No petechiae, ecchymosis, easy bruisability  Skin:  No rash, tattoos, scars, edema      PHYSICAL EXAM:   Vital Signs:  Vital Signs Last 24 Hrs  T(C): 36.5 (17 Sep 2023 20:43), Max: 36.7 (17 Sep 2023 04:00)  T(F): 97.7 (17 Sep 2023 20:43), Max: 98 (17 Sep 2023 04:00)  HR: 73 (17 Sep 2023 20:43) (62 - 73)  BP: 157/77 (17 Sep 2023 20:43) (136/71 - 157/77)  BP(mean): --  RR: 18 (17 Sep 2023 20:43) (18 - 18)  SpO2: 99% (17 Sep 2023 20:43) (96% - 99%)    Parameters below as of 17 Sep 2023 20:43  Patient On (Oxygen Delivery Method): room air      Daily     Daily I&O's Summary    16 Sep 2023 07:01  -  17 Sep 2023 07:00  --------------------------------------------------------  IN: 480 mL / OUT: 0 mL / NET: 480 mL    17 Sep 2023 07:01  -  17 Sep 2023 22:46  --------------------------------------------------------  IN: 480 mL / OUT: 0 mL / NET: 480 mL        GENERAL:  Appears stated age, well-groomed, well-nourished, no distress  HEENT:  NC/AT,  conjunctivae clear and pink, no thyromegaly, nodules, adenopathy, no JVD, sclera -anicteric  CHEST:  Full & symmetric excursion, no increased effort, breath sounds clear  HEART:  Regular rhythm, S1, S2, no murmur/rub/S3/S4, no abdominal bruit, no edema  ABDOMEN:  Soft, non-tender, non-distended, normoactive bowel sounds,  no masses ,no hepato-splenomegaly, no signs of chronic liver disease  EXTEREMITIES:  no cyanosis,clubbing or edema  SKIN:  No rash/erythema/ecchymoses/petechiae/wounds/abscess/warm/dry  NEURO:  Alert, oriented, no asterixis, no tremor, no encephalopathy      LABS:                        7.4    5.36  )-----------( 178      ( 17 Sep 2023 07:12 )             24.4     09-17    137  |  105  |  27<H>  ----------------------------<  107<H>  3.9   |  24  |  1.22    Ca    10.5      17 Sep 2023 07:10        Urinalysis Basic - ( 17 Sep 2023 07:10 )    Color: x / Appearance: x / SG: x / pH: x  Gluc: 107 mg/dL / Ketone: x  / Bili: x / Urobili: x   Blood: x / Protein: x / Nitrite: x   Leuk Esterase: x / RBC: x / WBC x   Sq Epi: x / Non Sq Epi: x / Bacteria: x      amylase   lipase  RADIOLOGY & ADDITIONAL TESTS:

## 2023-09-17 NOTE — PROGRESS NOTE ADULT - ASSESSMENT
a/p anemia    plan  daily cbc   transfuse prn   check iron studies   ppi once a day  check stool occult blood  further recommendations pending above

## 2023-09-17 NOTE — PROGRESS NOTE ADULT - SUBJECTIVE AND OBJECTIVE BOX
Patient is a 78y old  Female who presents with a chief complaint of CP. CHEATHAM (16 Sep 2023 13:37)      SUBJECTIVE / OVERNIGHT EVENTS: c/o back pain, L leg pain, legs tingling  Review of Systems  chest pain no  palpitations no  sob no  nausea no  headache no    MEDICATIONS  (STANDING):  ascorbic acid 500 milliGRAM(s) Oral daily  aspirin enteric coated 81 milliGRAM(s) Oral daily  atorvastatin 80 milliGRAM(s) Oral at bedtime  chlorhexidine 2% Cloths 1 Application(s) Topical <User Schedule>  dextrose 5%. 1000 milliLiter(s) (50 mL/Hr) IV Continuous <Continuous>  dextrose 5%. 1000 milliLiter(s) (100 mL/Hr) IV Continuous <Continuous>  dextrose 50% Injectable 25 Gram(s) IV Push once  dextrose 50% Injectable 25 Gram(s) IV Push once  dextrose 50% Injectable 12.5 Gram(s) IV Push once  ferrous    sulfate 325 milliGRAM(s) Oral daily  glucagon  Injectable 1 milliGRAM(s) IntraMuscular once  heparin   Injectable 5000 Unit(s) SubCutaneous every 12 hours  hydrochlorothiazide 12.5 milliGRAM(s) Oral daily  influenza  Vaccine (HIGH DOSE) 0.7 milliLiter(s) IntraMuscular once  insulin lispro (ADMELOG) corrective regimen sliding scale   SubCutaneous at bedtime  insulin lispro (ADMELOG) corrective regimen sliding scale   SubCutaneous three times a day before meals  losartan 100 milliGRAM(s) Oral daily  multivitamin 1 Tablet(s) Oral daily  pantoprazole    Tablet 40 milliGRAM(s) Oral before breakfast  senna 2 Tablet(s) Oral at bedtime    MEDICATIONS  (PRN):  acetaminophen     Tablet .. 650 milliGRAM(s) Oral every 6 hours PRN Temp greater or equal to 38C (100.4F), Mild Pain (1 - 3)  aluminum hydroxide/magnesium hydroxide/simethicone Suspension 30 milliLiter(s) Oral every 4 hours PRN Dyspepsia  dextrose Oral Gel 15 Gram(s) Oral once PRN Blood Glucose LESS THAN 70 milliGRAM(s)/deciliter  melatonin 3 milliGRAM(s) Oral at bedtime PRN Insomnia  ondansetron Injectable 4 milliGRAM(s) IV Push every 8 hours PRN Nausea and/or Vomiting      Vital Signs Last 24 Hrs  T(C): 36.6 (17 Sep 2023 16:35), Max: 36.7 (17 Sep 2023 04:00)  T(F): 97.9 (17 Sep 2023 16:35), Max: 98 (17 Sep 2023 04:00)  HR: 64 (17 Sep 2023 16:35) (62 - 73)  BP: 148/72 (17 Sep 2023 16:35) (136/71 - 149/75)  BP(mean): --  RR: 18 (17 Sep 2023 16:35) (18 - 18)  SpO2: 99% (17 Sep 2023 16:35) (96% - 99%)    Parameters below as of 17 Sep 2023 16:35  Patient On (Oxygen Delivery Method): room air        PHYSICAL EXAM:  GENERAL: NAD, well-developed  HEAD:  Atraumatic, Normocephalic  EYES: EOMI, PERRLA, conjunctiva and sclera clear  NECK: Supple, No JVD  CHEST/LUNG: Clear to auscultation bilaterally; No wheeze  HEART: Regular rate and rhythm; No murmurs, rubs, or gallops  ABDOMEN: Soft, Nontender, Nondistended; Bowel sounds present  EXTREMITIES:  2+ Peripheral Pulses, No clubbing, cyanosis, or edema  PSYCH: AAOx3  NEUROLOGY: non-focal  SKIN: No rashes or lesions    LABS:                        7.4    5.36  )-----------( 178      ( 17 Sep 2023 07:12 )             24.4     09-17    137  |  105  |  27<H>  ----------------------------<  107<H>  3.9   |  24  |  1.22    Ca    10.5      17 Sep 2023 07:10            Urinalysis Basic - ( 17 Sep 2023 07:10 )    Color: x / Appearance: x / SG: x / pH: x  Gluc: 107 mg/dL / Ketone: x  / Bili: x / Urobili: x   Blood: x / Protein: x / Nitrite: x   Leuk Esterase: x / RBC: x / WBC x   Sq Epi: x / Non Sq Epi: x / Bacteria: x          RADIOLOGY & ADDITIONAL TESTS:    Imaging Personally Reviewed:    Consultant(s) Notes Reviewed:      Care Discussed with Consultants/Other Providers:

## 2023-09-17 NOTE — PROGRESS NOTE ADULT - ASSESSMENT
78y F pmh htn, hld, dm, p/w  2-3 days of substernal non- radiating CP, SOB, and progressive CHEATHAM x past few weeks admitted for CP w/u    Angina pectoris.   - Obese pt with multiple risk factor ( htn, DM) present  for CP, sob  - Afebrile, VSS, clinically nontoxic   - EKG: NSR   - CXR neg   - Telemetry   - Echo   - Cath pending   - Cardio follow Beny Pryor    Anemia iron deficiency  - GI follow  - Guaiac negative  - supplement iron     D Dimer elevated  - CTa no embolus  - LED no DVT    HTN (hypertension).   - losartan 100mg   - Monitor.    HLD (hyperlipidemia).   - c/w statin.    DM2 (diabetes mellitus, type 2).   - Hold home Metformin   - ISS ACHS  - Check FS, adjust insulin accordingly   - DASH/CC diet  - Check A1c.    Back pain/ Leg pain  - MRI LS    Prophylactic measure.   - DVT ppx: Heparin sq  - GI ppx: PPI   - Diet: DASH/ CC.    Brock Randolph MD phone [phone]  78y F pmh htn, hld, dm, p/w  2-3 days of substernal non- radiating CP, SOB, and progressive CHEATHAM x past few weeks admitted for CP w/u    Angina pectoris.   - Obese pt with multiple risk factor ( htn, DM) present  for CP, sob  - Afebrile, VSS, clinically nontoxic   - EKG: NSR   - CXR neg   - Telemetry   - Echo   - Cath pending   - Cardio follow Beny Pryor    Anemia iron deficiency  - GI follow  - Guaiac negative  - supplement iron     D Dimer elevated  - CTa no embolus  - LED no DVT    HTN (hypertension).   - losartan 100mg   - Monitor.    HLD (hyperlipidemia).   - c/w statin.    DM2 (diabetes mellitus, type 2).   - Hold home Metformin   - ISS ACHS  - Check FS, adjust insulin accordingly   - DASH/CC diet  - Check A1c.    Back pain/ Leg pain  - MRI LS    Prophylactic measure.   - DVT ppx: Heparin sq  - GI ppx: PPI   - Diet: DASH/ CC.    Brock Randolph MD phone [phone]  78y F pmh htn, hld, dm, p/w  2-3 days of substernal non- radiating CP, SOB, and progressive CHEATHAM x past few weeks admitted for CP w/u    Angina pectoris.   - Obese pt with multiple risk factor ( htn, DM) present  for CP, sob  - Afebrile, VSS, clinically nontoxic   - EKG: NSR   - CXR neg   - Telemetry   - Echo   - Cath pending   - Cardio follow Beny Pryor    Anemia iron deficiency  - GI follow  - Guaiac negative  - supplement iron     D Dimer elevated  - CTa no embolus  - LED no DVT    HTN (hypertension).   - losartan 100mg   - Monitor.    HLD (hyperlipidemia).   - c/w statin.    DM2 (diabetes mellitus, type 2).   - Hold home Metformin   - ISS ACHS  - Check FS, adjust insulin accordingly   - DASH/CC diet  - Check A1c.    Back pain/ Leg pain  - MRI LS    Prophylactic measure.   - DVT ppx: Heparin sq  - GI ppx: PPI   - Diet: DASH/ CC.    Brock Randolph MD phone [phone]

## 2023-09-18 LAB
ANION GAP SERPL CALC-SCNC: 12 MMOL/L — SIGNIFICANT CHANGE UP (ref 5–17)
BLD GP AB SCN SERPL QL: NEGATIVE — SIGNIFICANT CHANGE UP
BUN SERPL-MCNC: 27 MG/DL — HIGH (ref 7–23)
CALCIUM SERPL-MCNC: 10.7 MG/DL — HIGH (ref 8.4–10.5)
CHLORIDE SERPL-SCNC: 104 MMOL/L — SIGNIFICANT CHANGE UP (ref 96–108)
CO2 SERPL-SCNC: 22 MMOL/L — SIGNIFICANT CHANGE UP (ref 22–31)
CREAT SERPL-MCNC: 1.6 MG/DL — HIGH (ref 0.5–1.3)
EGFR: 33 ML/MIN/1.73M2 — LOW
GLUCOSE BLDC GLUCOMTR-MCNC: 110 MG/DL — HIGH (ref 70–99)
GLUCOSE BLDC GLUCOMTR-MCNC: 111 MG/DL — HIGH (ref 70–99)
GLUCOSE BLDC GLUCOMTR-MCNC: 115 MG/DL — HIGH (ref 70–99)
GLUCOSE BLDC GLUCOMTR-MCNC: 123 MG/DL — HIGH (ref 70–99)
GLUCOSE SERPL-MCNC: 97 MG/DL — SIGNIFICANT CHANGE UP (ref 70–99)
HCT VFR BLD CALC: 24.7 % — LOW (ref 34.5–45)
HGB BLD-MCNC: 7.2 G/DL — LOW (ref 11.5–15.5)
MCHC RBC-ENTMCNC: 24.3 PG — LOW (ref 27–34)
MCHC RBC-ENTMCNC: 29.1 GM/DL — LOW (ref 32–36)
MCV RBC AUTO: 83.4 FL — SIGNIFICANT CHANGE UP (ref 80–100)
NRBC # BLD: 0 /100 WBCS — SIGNIFICANT CHANGE UP (ref 0–0)
PLATELET # BLD AUTO: 170 K/UL — SIGNIFICANT CHANGE UP (ref 150–400)
POTASSIUM SERPL-MCNC: 3.9 MMOL/L — SIGNIFICANT CHANGE UP (ref 3.5–5.3)
POTASSIUM SERPL-SCNC: 3.9 MMOL/L — SIGNIFICANT CHANGE UP (ref 3.5–5.3)
RBC # BLD: 2.96 M/UL — LOW (ref 3.8–5.2)
RBC # FLD: 17.9 % — HIGH (ref 10.3–14.5)
RH IG SCN BLD-IMP: POSITIVE — SIGNIFICANT CHANGE UP
SODIUM SERPL-SCNC: 138 MMOL/L — SIGNIFICANT CHANGE UP (ref 135–145)
WBC # BLD: 5.55 K/UL — SIGNIFICANT CHANGE UP (ref 3.8–10.5)
WBC # FLD AUTO: 5.55 K/UL — SIGNIFICANT CHANGE UP (ref 3.8–10.5)

## 2023-09-18 PROCEDURE — 93010 ELECTROCARDIOGRAM REPORT: CPT

## 2023-09-18 RX ORDER — HYDRALAZINE HCL 50 MG
5 TABLET ORAL ONCE
Refills: 0 | Status: COMPLETED | OUTPATIENT
Start: 2023-09-18 | End: 2023-09-18

## 2023-09-18 RX ADMIN — PANTOPRAZOLE SODIUM 40 MILLIGRAM(S): 20 TABLET, DELAYED RELEASE ORAL at 05:10

## 2023-09-18 RX ADMIN — Medication 1 TABLET(S): at 10:53

## 2023-09-18 RX ADMIN — Medication 325 MILLIGRAM(S): at 10:53

## 2023-09-18 RX ADMIN — Medication 650 MILLIGRAM(S): at 11:30

## 2023-09-18 RX ADMIN — Medication 500 MILLIGRAM(S): at 10:54

## 2023-09-18 RX ADMIN — CHLORHEXIDINE GLUCONATE 1 APPLICATION(S): 213 SOLUTION TOPICAL at 05:09

## 2023-09-18 RX ADMIN — SENNA PLUS 2 TABLET(S): 8.6 TABLET ORAL at 21:41

## 2023-09-18 RX ADMIN — HEPARIN SODIUM 5000 UNIT(S): 5000 INJECTION INTRAVENOUS; SUBCUTANEOUS at 18:00

## 2023-09-18 RX ADMIN — Medication 650 MILLIGRAM(S): at 10:54

## 2023-09-18 RX ADMIN — LOSARTAN POTASSIUM 100 MILLIGRAM(S): 100 TABLET, FILM COATED ORAL at 05:10

## 2023-09-18 RX ADMIN — Medication 81 MILLIGRAM(S): at 10:53

## 2023-09-18 RX ADMIN — Medication 650 MILLIGRAM(S): at 00:30

## 2023-09-18 RX ADMIN — HEPARIN SODIUM 5000 UNIT(S): 5000 INJECTION INTRAVENOUS; SUBCUTANEOUS at 05:09

## 2023-09-18 RX ADMIN — Medication 5 MILLIGRAM(S): at 18:00

## 2023-09-18 RX ADMIN — ATORVASTATIN CALCIUM 80 MILLIGRAM(S): 80 TABLET, FILM COATED ORAL at 21:41

## 2023-09-18 NOTE — PROGRESS NOTE ADULT - SUBJECTIVE AND OBJECTIVE BOX
INTERVAL HPI/OVERNIGHT EVENTS:  pt seen and examined No new overnight event.    No N/V/D.  Tolerating diet.  FOBT negative  awaiting cath     Allergies    No Known Allergies    Intolerances          General:  No wt loss, fevers, chills, night sweats, fatigue,   Eyes:  Good vision, no reported pain  ENT:  No sore throat, pain, runny nose, dysphagia  CV:  No pain, palpitations, hypo/hypertension  Resp:  No dyspnea, cough, tachypnea, wheezing  GI:  No pain, No nausea, No vomiting, No diarrhea, No constipation, No weight loss, No fever, No pruritis, No rectal bleeding, No tarry stools, No dysphagia,  :  No pain, bleeding, incontinence, nocturia  Muscle:  No pain, weakness  Neuro:  No weakness, tingling, memory problems  Psych:  No fatigue, insomnia, mood problems, depression  Endocrine:  No polyuria, polydipsia, cold/heat intolerance  Heme:  No petechiae, ecchymosis, easy bruisability  Skin:  No rash, tattoos, scars, edema      PHYSICAL EXAM:   Vital Signs Last 24 Hrs  T(C): 36.6 (18 Sep 2023 11:11), Max: 36.7 (18 Sep 2023 04:00)  T(F): 97.8 (18 Sep 2023 11:11), Max: 98 (18 Sep 2023 04:00)  HR: 71 (18 Sep 2023 11:11) (64 - 73)  BP: 169/52 (18 Sep 2023 11:11) (142/67 - 169/52)  BP(mean): 83 (18 Sep 2023 11:11) (83 - 83)  RR: 16 (18 Sep 2023 11:11) (16 - 18)  SpO2: 98% (18 Sep 2023 11:11) (97% - 99%)    Parameters below as of 18 Sep 2023 11:11  Patient On (Oxygen Delivery Method): room air          Daily     Daily I&O's Detail    17 Sep 2023 07:01  -  18 Sep 2023 07:00  --------------------------------------------------------  IN:    Oral Fluid: 600 mL  Total IN: 600 mL    OUT:  Total OUT: 0 mL    Total NET: 600 mL        GENERAL:  Appears stated age, well-groomed, well-nourished, no distress  HEENT:  NC/AT,  conjunctivae clear and pink, no thyromegaly, nodules, adenopathy, no JVD, sclera -anicteric  CHEST:  Full & symmetric excursion, no increased effort, breath sounds clear  HEART:  Regular rhythm, S1, S2, no murmur/rub/S3/S4, no abdominal bruit, no edema  ABDOMEN:  Soft, non-tender, non-distended, normoactive bowel sounds,  no masses ,no hepato-splenomegaly, no signs of chronic liver disease  EXTEREMITIES:  no cyanosis,clubbing or edema  SKIN:  No rash/erythema/ecchymoses/petechiae/wounds/abscess/warm/dry  NEURO:  Alert, oriented, no asterixis, no tremor, no encephalopathy      LABS:                        7.2    5.55  )-----------( 170      ( 18 Sep 2023 07:18 )             24.7   09-18    138  |  104  |  27<H>  ----------------------------<  97  3.9   |  22  |  1.60<H>    Ca    10.7<H>      18 Sep 2023 07:18        Urinalysis Basic - ( 17 Sep 2023 07:10 )    Color: x / Appearance: x / SG: x / pH: x  Gluc: 107 mg/dL / Ketone: x  / Bili: x / Urobili: x   Blood: x / Protein: x / Nitrite: x   Leuk Esterase: x / RBC: x / WBC x   Sq Epi: x / Non Sq Epi: x / Bacteria: x      amylase   lipase  RADIOLOGY & ADDITIONAL TESTS:

## 2023-09-18 NOTE — PROGRESS NOTE ADULT - ASSESSMENT
a/p anemia      daily cbc   transfuse prn   check iron studies   ppi once a day  occult negative  awaiting cath   cardiology following

## 2023-09-18 NOTE — PROGRESS NOTE ADULT - SUBJECTIVE AND OBJECTIVE BOX
Cardiovascular Disease Progress Note    Overnight events: No acute events overnight.  no new cardiac sx  Otherwise review of systems negative    Objective Findings:  T(C): 36.7 (09-18-23 @ 04:00), Max: 36.7 (09-18-23 @ 04:00)  HR: 64 (09-18-23 @ 04:00) (64 - 73)  BP: 142/67 (09-18-23 @ 04:00) (136/71 - 157/77)  RR: 18 (09-18-23 @ 04:00) (18 - 18)  SpO2: 97% (09-18-23 @ 04:00) (97% - 99%)  Wt(kg): --  Daily     Daily       Physical Exam:  Gen: NAD  HEENT: EOMI  CV: RRR, normal S1 + S2, no m/r/g  Lungs: CTAB  Abd: soft, non-tender  Ext: No edema    Telemetry:    Laboratory Data:                        7.4    5.36  )-----------( 178      ( 17 Sep 2023 07:12 )             24.4     09-17    137  |  105  |  27<H>  ----------------------------<  107<H>  3.9   |  24  |  1.22    Ca    10.5      17 Sep 2023 07:10                Inpatient Medications:  MEDICATIONS  (STANDING):  ascorbic acid 500 milliGRAM(s) Oral daily  aspirin enteric coated 81 milliGRAM(s) Oral daily  atorvastatin 80 milliGRAM(s) Oral at bedtime  chlorhexidine 2% Cloths 1 Application(s) Topical <User Schedule>  dextrose 5%. 1000 milliLiter(s) (100 mL/Hr) IV Continuous <Continuous>  dextrose 5%. 1000 milliLiter(s) (50 mL/Hr) IV Continuous <Continuous>  dextrose 50% Injectable 25 Gram(s) IV Push once  dextrose 50% Injectable 12.5 Gram(s) IV Push once  dextrose 50% Injectable 25 Gram(s) IV Push once  ferrous    sulfate 325 milliGRAM(s) Oral daily  glucagon  Injectable 1 milliGRAM(s) IntraMuscular once  heparin   Injectable 5000 Unit(s) SubCutaneous every 12 hours  hydrochlorothiazide 12.5 milliGRAM(s) Oral daily  influenza  Vaccine (HIGH DOSE) 0.7 milliLiter(s) IntraMuscular once  insulin lispro (ADMELOG) corrective regimen sliding scale   SubCutaneous at bedtime  insulin lispro (ADMELOG) corrective regimen sliding scale   SubCutaneous three times a day before meals  losartan 100 milliGRAM(s) Oral daily  multivitamin 1 Tablet(s) Oral daily  pantoprazole    Tablet 40 milliGRAM(s) Oral before breakfast  senna 2 Tablet(s) Oral at bedtime      Assessment:  78y F pmh htn, hld, dm, p/w  2-3 days of substernal non- radiating CP, SOB, and progressive CHEATHAM x past few weeks    Recs:  cardiac stable  no e/o acs by ecg or biomarkers  elevated d-dimer --> CTA chest neg for PE and lower ext venous duplex neg for DVT  cardiac cath pending   vol status acceptabe, cxr clear, bnp level normal for age = cw hctz 12.5mg for improved bp control and e/o elevated LA-P on echo  s/p echo = normal lv/rv fxn, + diastolic dysfunction  anemia, fobt neg, gi following  will follow          Over 25 minutes spent on total encounter; more than 50% of the visit was spent counseling and/or coordinating care by the attending physician.      Beny Pryor MD   Cardiovascular Disease  [phone] Cardiovascular Disease Progress Note    Overnight events: No acute events overnight.  no new cardiac sx  Otherwise review of systems negative    Objective Findings:  T(C): 36.7 (09-18-23 @ 04:00), Max: 36.7 (09-18-23 @ 04:00)  HR: 64 (09-18-23 @ 04:00) (64 - 73)  BP: 142/67 (09-18-23 @ 04:00) (136/71 - 157/77)  RR: 18 (09-18-23 @ 04:00) (18 - 18)  SpO2: 97% (09-18-23 @ 04:00) (97% - 99%)  Wt(kg): --  Daily     Daily       Physical Exam:  Gen: NAD  HEENT: EOMI  CV: RRR, normal S1 + S2, no m/r/g  Lungs: CTAB  Abd: soft, non-tender  Ext: No edema    Telemetry:    Laboratory Data:                        7.4    5.36  )-----------( 178      ( 17 Sep 2023 07:12 )             24.4     09-17    137  |  105  |  27<H>  ----------------------------<  107<H>  3.9   |  24  |  1.22    Ca    10.5      17 Sep 2023 07:10                Inpatient Medications:  MEDICATIONS  (STANDING):  ascorbic acid 500 milliGRAM(s) Oral daily  aspirin enteric coated 81 milliGRAM(s) Oral daily  atorvastatin 80 milliGRAM(s) Oral at bedtime  chlorhexidine 2% Cloths 1 Application(s) Topical <User Schedule>  dextrose 5%. 1000 milliLiter(s) (100 mL/Hr) IV Continuous <Continuous>  dextrose 5%. 1000 milliLiter(s) (50 mL/Hr) IV Continuous <Continuous>  dextrose 50% Injectable 25 Gram(s) IV Push once  dextrose 50% Injectable 12.5 Gram(s) IV Push once  dextrose 50% Injectable 25 Gram(s) IV Push once  ferrous    sulfate 325 milliGRAM(s) Oral daily  glucagon  Injectable 1 milliGRAM(s) IntraMuscular once  heparin   Injectable 5000 Unit(s) SubCutaneous every 12 hours  hydrochlorothiazide 12.5 milliGRAM(s) Oral daily  influenza  Vaccine (HIGH DOSE) 0.7 milliLiter(s) IntraMuscular once  insulin lispro (ADMELOG) corrective regimen sliding scale   SubCutaneous at bedtime  insulin lispro (ADMELOG) corrective regimen sliding scale   SubCutaneous three times a day before meals  losartan 100 milliGRAM(s) Oral daily  multivitamin 1 Tablet(s) Oral daily  pantoprazole    Tablet 40 milliGRAM(s) Oral before breakfast  senna 2 Tablet(s) Oral at bedtime      Assessment:  78y F pmh htn, hld, dm, p/w  2-3 days of substernal non- radiating CP, SOB, and progressive CHEATHAM x past few weeks    Recs:  cardiac stable  no e/o acs by ecg or biomarkers  elevated d-dimer --> CTA chest neg for PE and lower ext venous duplex neg for DVT  cardiac cath pending   vol status acceptabe, cxr clear, bnp level normal for age = cw hctz 12.5mg for improved bp control and e/o elevated LA-P on echo  s/p echo = normal lv/rv fxn, + diastolic dysfunction  anemia, fobt neg, gi following  will follow          Over 25 minutes spent on total encounter; more than 50% of the visit was spent counseling and/or coordinating care by the attending physician.      Beny Pryor MD   Cardiovascular Disease  [phone] Cardiovascular Disease Progress Note    Overnight events: No acute events overnight.  no new cardiac sx  Otherwise review of systems negative    Objective Findings:  T(C): 36.7 (09-18-23 @ 04:00), Max: 36.7 (09-18-23 @ 04:00)  HR: 64 (09-18-23 @ 04:00) (64 - 73)  BP: 142/67 (09-18-23 @ 04:00) (136/71 - 157/77)  RR: 18 (09-18-23 @ 04:00) (18 - 18)  SpO2: 97% (09-18-23 @ 04:00) (97% - 99%)  Wt(kg): --  Daily     Daily       Physical Exam:  Gen: NAD  HEENT: EOMI  CV: RRR, normal S1 + S2, no m/r/g  Lungs: CTAB  Abd: soft, non-tender  Ext: No edema    Telemetry:    Laboratory Data:                        7.4    5.36  )-----------( 178      ( 17 Sep 2023 07:12 )             24.4     09-17    137  |  105  |  27<H>  ----------------------------<  107<H>  3.9   |  24  |  1.22    Ca    10.5      17 Sep 2023 07:10                Inpatient Medications:  MEDICATIONS  (STANDING):  ascorbic acid 500 milliGRAM(s) Oral daily  aspirin enteric coated 81 milliGRAM(s) Oral daily  atorvastatin 80 milliGRAM(s) Oral at bedtime  chlorhexidine 2% Cloths 1 Application(s) Topical <User Schedule>  dextrose 5%. 1000 milliLiter(s) (100 mL/Hr) IV Continuous <Continuous>  dextrose 5%. 1000 milliLiter(s) (50 mL/Hr) IV Continuous <Continuous>  dextrose 50% Injectable 25 Gram(s) IV Push once  dextrose 50% Injectable 12.5 Gram(s) IV Push once  dextrose 50% Injectable 25 Gram(s) IV Push once  ferrous    sulfate 325 milliGRAM(s) Oral daily  glucagon  Injectable 1 milliGRAM(s) IntraMuscular once  heparin   Injectable 5000 Unit(s) SubCutaneous every 12 hours  hydrochlorothiazide 12.5 milliGRAM(s) Oral daily  influenza  Vaccine (HIGH DOSE) 0.7 milliLiter(s) IntraMuscular once  insulin lispro (ADMELOG) corrective regimen sliding scale   SubCutaneous at bedtime  insulin lispro (ADMELOG) corrective regimen sliding scale   SubCutaneous three times a day before meals  losartan 100 milliGRAM(s) Oral daily  multivitamin 1 Tablet(s) Oral daily  pantoprazole    Tablet 40 milliGRAM(s) Oral before breakfast  senna 2 Tablet(s) Oral at bedtime      Assessment:  78y F pmh htn, hld, dm, p/w  2-3 days of substernal non- radiating CP, SOB, and progressive CHEATHAM x past few weeks    Recs:  cardiac stable  no e/o acs by ecg or biomarkers  elevated d-dimer --> CTA chest neg for PE and lower ext venous duplex neg for DVT  cardiac cath pending   vol status acceptabe, cxr clear, bnp level normal for age = cw hctz 12.5mg for improved bp control and e/o elevated LA-P on echo  s/p echo = normal lv/rv fxn, + diastolic dysfunction  anemia, fobt neg, gi following  will follow          Over 25 minutes spent on total encounter; more than 50% of the visit was spent counseling and/or coordinating care by the attending physician.      Beny Pryor MD   Cardiovascular Disease  [phone]

## 2023-09-18 NOTE — PROGRESS NOTE ADULT - SUBJECTIVE AND OBJECTIVE BOX
Patient is a 78y old  Female who presents with a chief complaint of CP. CHEATHAM (18 Sep 2023 12:46)      SUBJECTIVE / OVERNIGHT EVENTS: Comfortable without new complaints. Cath cancelled 2 to anemia.   Review of Systems  chest pain no  palpitations no  sob no  nausea no  headache no    MEDICATIONS  (STANDING):  ascorbic acid 500 milliGRAM(s) Oral daily  aspirin enteric coated 81 milliGRAM(s) Oral daily  atorvastatin 80 milliGRAM(s) Oral at bedtime  chlorhexidine 2% Cloths 1 Application(s) Topical <User Schedule>  dextrose 5%. 1000 milliLiter(s) (50 mL/Hr) IV Continuous <Continuous>  dextrose 5%. 1000 milliLiter(s) (100 mL/Hr) IV Continuous <Continuous>  dextrose 50% Injectable 25 Gram(s) IV Push once  dextrose 50% Injectable 25 Gram(s) IV Push once  dextrose 50% Injectable 12.5 Gram(s) IV Push once  ferrous    sulfate 325 milliGRAM(s) Oral daily  glucagon  Injectable 1 milliGRAM(s) IntraMuscular once  heparin   Injectable 5000 Unit(s) SubCutaneous every 12 hours  hydrochlorothiazide 12.5 milliGRAM(s) Oral daily  influenza  Vaccine (HIGH DOSE) 0.7 milliLiter(s) IntraMuscular once  insulin lispro (ADMELOG) corrective regimen sliding scale   SubCutaneous at bedtime  insulin lispro (ADMELOG) corrective regimen sliding scale   SubCutaneous three times a day before meals  losartan 100 milliGRAM(s) Oral daily  multivitamin 1 Tablet(s) Oral daily  pantoprazole    Tablet 40 milliGRAM(s) Oral before breakfast  senna 2 Tablet(s) Oral at bedtime    MEDICATIONS  (PRN):  acetaminophen     Tablet .. 650 milliGRAM(s) Oral every 6 hours PRN Temp greater or equal to 38C (100.4F), Mild Pain (1 - 3)  aluminum hydroxide/magnesium hydroxide/simethicone Suspension 30 milliLiter(s) Oral every 4 hours PRN Dyspepsia  dextrose Oral Gel 15 Gram(s) Oral once PRN Blood Glucose LESS THAN 70 milliGRAM(s)/deciliter  melatonin 3 milliGRAM(s) Oral at bedtime PRN Insomnia  ondansetron Injectable 4 milliGRAM(s) IV Push every 8 hours PRN Nausea and/or Vomiting      Vital Signs Last 24 Hrs  T(C): 36.8 (18 Sep 2023 20:39), Max: 36.8 (18 Sep 2023 20:39)  T(F): 98.2 (18 Sep 2023 20:39), Max: 98.2 (18 Sep 2023 20:39)  HR: 69 (18 Sep 2023 20:39) (64 - 75)  BP: 155/69 (18 Sep 2023 20:39) (142/62 - 184/70)  BP(mean): 83 (18 Sep 2023 11:11) (83 - 83)  RR: 18 (18 Sep 2023 20:39) (16 - 18)  SpO2: 98% (18 Sep 2023 20:39) (97% - 99%)    Parameters below as of 18 Sep 2023 20:39  Patient On (Oxygen Delivery Method): room air        PHYSICAL EXAM:  GENERAL: NAD, well-developed  HEAD:  Atraumatic, Normocephalic  EYES: EOMI, PERRLA, conjunctiva and sclera clear  NECK: Supple, No JVD  CHEST/LUNG: Clear to auscultation bilaterally; No wheeze  HEART: Regular rate and rhythm; No murmurs, rubs, or gallops  ABDOMEN: Soft, Nontender, Nondistended; Bowel sounds present  EXTREMITIES:  2+ Peripheral Pulses, No clubbing, cyanosis, or edema  PSYCH: AAOx3  NEUROLOGY: non-focal  SKIN: No rashes or lesions    LABS:                        7.2    5.55  )-----------( 170      ( 18 Sep 2023 07:18 )             24.7     09-18    138  |  104  |  27<H>  ----------------------------<  97  3.9   |  22  |  1.60<H>    Ca    10.7<H>      18 Sep 2023 07:18            Urinalysis Basic - ( 18 Sep 2023 07:18 )    Color: x / Appearance: x / SG: x / pH: x  Gluc: 97 mg/dL / Ketone: x  / Bili: x / Urobili: x   Blood: x / Protein: x / Nitrite: x   Leuk Esterase: x / RBC: x / WBC x   Sq Epi: x / Non Sq Epi: x / Bacteria: x          RADIOLOGY & ADDITIONAL TESTS:    Imaging Personally Reviewed:    Consultant(s) Notes Reviewed:      Care Discussed with Consultants/Other Providers:

## 2023-09-18 NOTE — PROGRESS NOTE ADULT - ASSESSMENT
78y F pmh htn, hld, dm, p/w  2-3 days of substernal non- radiating CP, SOB, and progressive CHEATHAM x past few weeks admitted for CP w/u    Angina pectoris.   - Obese pt with multiple risk factor ( htn, DM) present  for CP, sob  - Afebrile, VSS, clinically nontoxic   - EKG: NSR   - CXR neg   - Telemetry   - Echo   - Cath reschedule    - Cardio follow Beny Pryor    Anemia iron deficiency  - GI follow  - Guaiac negative  - supplement iron   - transfuse 1 PRBC    D Dimer elevated  - CTa no embolus  - LED no DVT    HTN (hypertension).   - losartan 100mg   - Monitor.    HLD (hyperlipidemia).   - c/w statin.    DM2 (diabetes mellitus, type 2).   - Hold home Metformin   - ISS ACHS  - Check FS, adjust insulin accordingly   - DASH/CC diet  - Check A1c.    Back pain/ Leg pain  - MRI LS noted  - Spine service evaluation    Prophylactic measure.   - DVT ppx: Heparin sq  - GI ppx: PPI   - Diet: DASH/ CC.    d/w patient and daughter (over phone)     Brock Randolph MD phone [phone]  78y F pmh htn, hld, dm, p/w  2-3 days of substernal non- radiating CP, SOB, and progressive CHEATHAM x past few weeks admitted for CP w/u    Angina pectoris.   - Obese pt with multiple risk factor ( htn, DM) present  for CP, sob  - Afebrile, VSS, clinically nontoxic   - EKG: NSR   - CXR neg   - Telemetry   - Echo   - Cath reschedule    - Cardio follow Beny Pryor    Anemia iron deficiency  - GI follow  - Guaiac negative  - supplement iron   - transfuse 1 PRBC    D Dimer elevated  - CTa no embolus  - LED no DVT    HTN (hypertension).   - losartan 100mg   - Monitor.    HLD (hyperlipidemia).   - c/w statin.    DM2 (diabetes mellitus, type 2).   - Hold home Metformin   - ISS ACHS  - Check FS, adjust insulin accordingly   - DASH/CC diet  - Check A1c.    Back pain/ Leg pain  - MRI LS noted  - Spine service evaluation    Prophylactic measure.   - DVT ppx: Heparin sq  - GI ppx: PPI   - Diet: DASH/ CC.    d/w patient and daughter (over phone)     Brock Randolph MD phone [phone]  78y F pmh htn, hld, dm, p/w  2-3 days of substernal non- radiating CP, SOB, and progressive CHEATHAM x past few weeks admitted for CP w/u    Angina pectoris.   - Obese pt with multiple risk factor ( htn, DM) present  for CP, sob  - Afebrile, VSS, clinically nontoxic   - EKG: NSR   - CXR neg   - Telemetry   - Echo   - Cath reschedule    - Cardio follow Beny Pryor    Anemia iron deficiency  - GI follow  - Guaiac negative  - supplement iron   - transfuse 1 PRBC    D Dimer elevated  - CTa no embolus  - LED no DVT    HTN (hypertension).   - losartan 100mg   - Monitor.    HLD (hyperlipidemia).   - c/w statin.    DM2 (diabetes mellitus, type 2).   - Hold home Metformin   - ISS ACHS  - Check FS, adjust insulin accordingly   - DASH/CC diet  - Check A1c.    Back pain/ Leg pain  - MRI LS noted  - Spine service evaluation    Prophylactic measure.   - DVT ppx: Heparin sq  - GI ppx: PPI   - Diet: DASH/ CC.    d/w patient and daughter (over phone)     Brock Randolph MD phone [phone]

## 2023-09-19 LAB
ANION GAP SERPL CALC-SCNC: 12 MMOL/L — SIGNIFICANT CHANGE UP (ref 5–17)
BUN SERPL-MCNC: 29 MG/DL — HIGH (ref 7–23)
CALCIUM SERPL-MCNC: 11.2 MG/DL — HIGH (ref 8.4–10.5)
CHLORIDE SERPL-SCNC: 104 MMOL/L — SIGNIFICANT CHANGE UP (ref 96–108)
CO2 SERPL-SCNC: 23 MMOL/L — SIGNIFICANT CHANGE UP (ref 22–31)
CREAT SERPL-MCNC: 1.33 MG/DL — HIGH (ref 0.5–1.3)
EGFR: 41 ML/MIN/1.73M2 — LOW
FERRITIN SERPL-MCNC: 34 NG/ML — SIGNIFICANT CHANGE UP (ref 13–330)
GLUCOSE BLDC GLUCOMTR-MCNC: 101 MG/DL — HIGH (ref 70–99)
GLUCOSE BLDC GLUCOMTR-MCNC: 102 MG/DL — HIGH (ref 70–99)
GLUCOSE BLDC GLUCOMTR-MCNC: 103 MG/DL — HIGH (ref 70–99)
GLUCOSE BLDC GLUCOMTR-MCNC: 141 MG/DL — HIGH (ref 70–99)
GLUCOSE SERPL-MCNC: 94 MG/DL — SIGNIFICANT CHANGE UP (ref 70–99)
HCT VFR BLD CALC: 27.7 % — LOW (ref 34.5–45)
HGB BLD-MCNC: 8.5 G/DL — LOW (ref 11.5–15.5)
IRON SATN MFR SERPL: 28 UG/DL — LOW (ref 30–160)
IRON SATN MFR SERPL: 7 % — LOW (ref 14–50)
MCHC RBC-ENTMCNC: 25.3 PG — LOW (ref 27–34)
MCHC RBC-ENTMCNC: 30.7 GM/DL — LOW (ref 32–36)
MCV RBC AUTO: 82.4 FL — SIGNIFICANT CHANGE UP (ref 80–100)
NRBC # BLD: 0 /100 WBCS — SIGNIFICANT CHANGE UP (ref 0–0)
PLATELET # BLD AUTO: 168 K/UL — SIGNIFICANT CHANGE UP (ref 150–400)
POTASSIUM SERPL-MCNC: 3.7 MMOL/L — SIGNIFICANT CHANGE UP (ref 3.5–5.3)
POTASSIUM SERPL-SCNC: 3.7 MMOL/L — SIGNIFICANT CHANGE UP (ref 3.5–5.3)
RBC # BLD: 3.36 M/UL — LOW (ref 3.8–5.2)
RBC # FLD: 17.5 % — HIGH (ref 10.3–14.5)
SODIUM SERPL-SCNC: 139 MMOL/L — SIGNIFICANT CHANGE UP (ref 135–145)
TIBC SERPL-MCNC: 385 UG/DL — SIGNIFICANT CHANGE UP (ref 220–430)
TRANSFERRIN SERPL-MCNC: 308 MG/DL — SIGNIFICANT CHANGE UP (ref 200–360)
UIBC SERPL-MCNC: 357 UG/DL — SIGNIFICANT CHANGE UP (ref 110–370)
WBC # BLD: 6.06 K/UL — SIGNIFICANT CHANGE UP (ref 3.8–10.5)
WBC # FLD AUTO: 6.06 K/UL — SIGNIFICANT CHANGE UP (ref 3.8–10.5)

## 2023-09-19 PROCEDURE — 99152 MOD SED SAME PHYS/QHP 5/>YRS: CPT

## 2023-09-19 PROCEDURE — 93454 CORONARY ARTERY ANGIO S&I: CPT | Mod: 26

## 2023-09-19 RX ADMIN — Medication 1 TABLET(S): at 12:33

## 2023-09-19 RX ADMIN — Medication 650 MILLIGRAM(S): at 08:32

## 2023-09-19 RX ADMIN — Medication 650 MILLIGRAM(S): at 09:00

## 2023-09-19 RX ADMIN — HEPARIN SODIUM 5000 UNIT(S): 5000 INJECTION INTRAVENOUS; SUBCUTANEOUS at 05:27

## 2023-09-19 RX ADMIN — SENNA PLUS 2 TABLET(S): 8.6 TABLET ORAL at 22:12

## 2023-09-19 RX ADMIN — CHLORHEXIDINE GLUCONATE 1 APPLICATION(S): 213 SOLUTION TOPICAL at 05:26

## 2023-09-19 RX ADMIN — PANTOPRAZOLE SODIUM 40 MILLIGRAM(S): 20 TABLET, DELAYED RELEASE ORAL at 05:28

## 2023-09-19 RX ADMIN — ATORVASTATIN CALCIUM 80 MILLIGRAM(S): 80 TABLET, FILM COATED ORAL at 22:12

## 2023-09-19 RX ADMIN — Medication 650 MILLIGRAM(S): at 22:32

## 2023-09-19 RX ADMIN — Medication 500 MILLIGRAM(S): at 12:33

## 2023-09-19 RX ADMIN — Medication 325 MILLIGRAM(S): at 12:33

## 2023-09-19 RX ADMIN — Medication 650 MILLIGRAM(S): at 23:33

## 2023-09-19 RX ADMIN — Medication 81 MILLIGRAM(S): at 12:33

## 2023-09-19 RX ADMIN — LOSARTAN POTASSIUM 100 MILLIGRAM(S): 100 TABLET, FILM COATED ORAL at 05:27

## 2023-09-19 NOTE — PROGRESS NOTE ADULT - SUBJECTIVE AND OBJECTIVE BOX
INTERVAL HPI/OVERNIGHT EVENTS:  pt seen and examined No new overnight event.    No N/V/D.  Tolerating diet.  cath cancelled 2/2 anemia   no bleeding     Allergies    No Known Allergies    Intolerances          General:  No wt loss, fevers, chills, night sweats, fatigue,   Eyes:  Good vision, no reported pain  ENT:  No sore throat, pain, runny nose, dysphagia  CV:  No pain, palpitations, hypo/hypertension  Resp:  No dyspnea, cough, tachypnea, wheezing  GI:  No pain, No nausea, No vomiting, No diarrhea, No constipation, No weight loss, No fever, No pruritis, No rectal bleeding, No tarry stools, No dysphagia,  :  No pain, bleeding, incontinence, nocturia  Muscle:  No pain, weakness  Neuro:  No weakness, tingling, memory problems  Psych:  No fatigue, insomnia, mood problems, depression  Endocrine:  No polyuria, polydipsia, cold/heat intolerance  Heme:  No petechiae, ecchymosis, easy bruisability  Skin:  No rash, tattoos, scars, edema      PHYSICAL EXAM:   Vital Signs Last 24 Hrs  T(C): 36.5 (19 Sep 2023 11:10), Max: 36.8 (18 Sep 2023 20:39)  T(F): 97.7 (19 Sep 2023 11:10), Max: 98.2 (18 Sep 2023 20:39)  HR: 69 (19 Sep 2023 11:10) (69 - 77)  BP: 135/66 (19 Sep 2023 11:10) (135/66 - 184/70)  BP(mean): --  RR: 18 (19 Sep 2023 11:10) (18 - 18)  SpO2: 97% (19 Sep 2023 11:10) (97% - 98%)    Parameters below as of 19 Sep 2023 11:10  Patient On (Oxygen Delivery Method): room air        Daily     Daily  I&O's Detail    18 Sep 2023 07:01  -  19 Sep 2023 07:00  --------------------------------------------------------  IN:    Oral Fluid: 500 mL  Total IN: 500 mL    OUT:  Total OUT: 0 mL    Total NET: 500 mL          GENERAL:  Appears stated age, well-groomed, well-nourished, no distress  HEENT:  NC/AT,  conjunctivae clear and pink, no thyromegaly, nodules, adenopathy, no JVD, sclera -anicteric  CHEST:  Full & symmetric excursion, no increased effort, breath sounds clear  HEART:  Regular rhythm, S1, S2, no murmur/rub/S3/S4, no abdominal bruit, no edema  ABDOMEN:  Soft, non-tender, non-distended, normoactive bowel sounds,  no masses ,no hepato-splenomegaly, no signs of chronic liver disease  EXTEREMITIES:  no cyanosis,clubbing or edema  SKIN:  No rash/erythema/ecchymoses/petechiae/wounds/abscess/warm/dry  NEURO:  Alert, oriented, no asterixis, no tremor, no encephalopathy      LABS:                        8.5    6.06  )-----------( 168      ( 19 Sep 2023 06:15 )             27.7   09-19    139  |  104  |  29<H>  ----------------------------<  94  3.7   |  23  |  1.33<H>    Ca    11.2<H>      19 Sep 2023 06:15        Urinalysis Basic - ( 17 Sep 2023 07:10 )    Color: x / Appearance: x / SG: x / pH: x  Gluc: 107 mg/dL / Ketone: x  / Bili: x / Urobili: x   Blood: x / Protein: x / Nitrite: x   Leuk Esterase: x / RBC: x / WBC x   Sq Epi: x / Non Sq Epi: x / Bacteria: x      amylase   lipase  RADIOLOGY & ADDITIONAL TESTS:

## 2023-09-19 NOTE — PROVIDER CONTACT NOTE (OTHER) - ASSESSMENT
Patient is AOx4 Omani speaking. Vitals are stable. No complaint of shortness of breath, palpitations or chest pain. RRA site is bruised however pulses are still present and extremity is warm to the touch. Patient is AOx4 Iraqi speaking. Vitals are stable. No complaint of shortness of breath, palpitations or chest pain. RRA site is bruised however pulses are still present and extremity is warm to the touch. Patient is AOx4 Cymro speaking. Vitals are stable. No complaint of shortness of breath, palpitations or chest pain. RRA site is bruised however pulses are still present and extremity is warm to the touch.

## 2023-09-19 NOTE — PROGRESS NOTE ADULT - ASSESSMENT
a/p anemia      daily cbc   transfuse prn   iron studies noted; would benefit from supplemental iron  ppi once a day  occult negative  awaiting cath   cardiology following  will follow  d/w pt     I reviewed the overnight course of events on the unit, re-confirming the patient history. I discussed the care with the patient and their family  The plan of care was discussed with the physician assistant and modifications were made to the notation where appropriate.   Differential diagnosis and plan of care discussed with patient after the evaluation  50 minutes spent on total encounter of which more than fifty percent of the encounter was spent counseling and/or coordinating care by the attending physician.  Advanced care planning was discussed with patient and family.  Advanced care planning forms were reviewed and discussed.  Risks, benefits and alternatives of gastroenterologic procedures were discussed in detail and all questions were answered.

## 2023-09-19 NOTE — PROVIDER CONTACT NOTE (OTHER) - SITUATION
R Buttock DTI
Patient is complaining of pain at RRA site. Patient is s/p diagnostic cath via RRA/RGA.

## 2023-09-19 NOTE — PROGRESS NOTE ADULT - SUBJECTIVE AND OBJECTIVE BOX
Cardiovascular Disease Progress Note    Overnight events: No acute events overnight.  no new cardiac sx  Otherwise review of systems negative    Objective Findings:  T(C): 36.6 (09-19-23 @ 04:45), Max: 36.8 (09-18-23 @ 20:39)  HR: 77 (09-19-23 @ 04:45) (69 - 77)  BP: 140/71 (09-19-23 @ 04:45) (140/71 - 184/70)  RR: 18 (09-19-23 @ 04:45) (16 - 18)  SpO2: 97% (09-19-23 @ 04:45) (97% - 98%)  Wt(kg): --  Daily Height in cm: 149.86 (18 Sep 2023 11:11)    Daily       Physical Exam:  Gen: NAD  HEENT: EOMI  CV: RRR, normal S1 + S2, no m/r/g  Lungs: CTAB  Abd: soft, non-tender  Ext: No edema    Telemetry:    Laboratory Data:                        8.5    6.06  )-----------( 168      ( 19 Sep 2023 06:15 )             27.7     09-19    139  |  104  |  29<H>  ----------------------------<  94  3.7   |  23  |  1.33<H>    Ca    11.2<H>      19 Sep 2023 06:15                Inpatient Medications:  MEDICATIONS  (STANDING):  ascorbic acid 500 milliGRAM(s) Oral daily  aspirin enteric coated 81 milliGRAM(s) Oral daily  atorvastatin 80 milliGRAM(s) Oral at bedtime  chlorhexidine 2% Cloths 1 Application(s) Topical <User Schedule>  dextrose 5%. 1000 milliLiter(s) (100 mL/Hr) IV Continuous <Continuous>  dextrose 5%. 1000 milliLiter(s) (50 mL/Hr) IV Continuous <Continuous>  dextrose 50% Injectable 25 Gram(s) IV Push once  dextrose 50% Injectable 25 Gram(s) IV Push once  dextrose 50% Injectable 12.5 Gram(s) IV Push once  ferrous    sulfate 325 milliGRAM(s) Oral daily  glucagon  Injectable 1 milliGRAM(s) IntraMuscular once  heparin   Injectable 5000 Unit(s) SubCutaneous every 12 hours  hydrochlorothiazide 12.5 milliGRAM(s) Oral daily  influenza  Vaccine (HIGH DOSE) 0.7 milliLiter(s) IntraMuscular once  insulin lispro (ADMELOG) corrective regimen sliding scale   SubCutaneous three times a day before meals  insulin lispro (ADMELOG) corrective regimen sliding scale   SubCutaneous at bedtime  losartan 100 milliGRAM(s) Oral daily  multivitamin 1 Tablet(s) Oral daily  pantoprazole    Tablet 40 milliGRAM(s) Oral before breakfast  senna 2 Tablet(s) Oral at bedtime      Assessment:  78y F pmh htn, hld, dm, p/w  2-3 days of substernal non- radiating CP, SOB, and progressive CHEATHAM x past few weeks    Recs:  cardiac stable  no e/o acs by ecg or biomarkers  elevated d-dimer --> CTA chest neg for PE and lower ext venous duplex neg for DVT  cardiac cath pending   TACOS, likely MARGOT ==> improving  vol status acceptabe, cxr clear, bnp level normal for age = cw hctz 12.5mg for improved bp control and e/o elevated LA-P on echo  s/p echo = normal lv/rv fxn, + diastolic dysfunction  anemia workup, fobt neg, gi following  will follow          Over 25 minutes spent on total encounter; more than 50% of the visit was spent counseling and/or coordinating care by the attending physician.      Beny Pryor MD   Cardiovascular Disease  [phone] Cardiovascular Disease Progress Note    Overnight events: No acute events overnight.  no new cardiac sx  Otherwise review of systems negative    Objective Findings:  T(C): 36.6 (09-19-23 @ 04:45), Max: 36.8 (09-18-23 @ 20:39)  HR: 77 (09-19-23 @ 04:45) (69 - 77)  BP: 140/71 (09-19-23 @ 04:45) (140/71 - 184/70)  RR: 18 (09-19-23 @ 04:45) (16 - 18)  SpO2: 97% (09-19-23 @ 04:45) (97% - 98%)  Wt(kg): --  Daily Height in cm: 149.86 (18 Sep 2023 11:11)    Daily       Physical Exam:  Gen: NAD  HEENT: EOMI  CV: RRR, normal S1 + S2, no m/r/g  Lungs: CTAB  Abd: soft, non-tender  Ext: No edema    Telemetry:    Laboratory Data:                        8.5    6.06  )-----------( 168      ( 19 Sep 2023 06:15 )             27.7     09-19    139  |  104  |  29<H>  ----------------------------<  94  3.7   |  23  |  1.33<H>    Ca    11.2<H>      19 Sep 2023 06:15                Inpatient Medications:  MEDICATIONS  (STANDING):  ascorbic acid 500 milliGRAM(s) Oral daily  aspirin enteric coated 81 milliGRAM(s) Oral daily  atorvastatin 80 milliGRAM(s) Oral at bedtime  chlorhexidine 2% Cloths 1 Application(s) Topical <User Schedule>  dextrose 5%. 1000 milliLiter(s) (100 mL/Hr) IV Continuous <Continuous>  dextrose 5%. 1000 milliLiter(s) (50 mL/Hr) IV Continuous <Continuous>  dextrose 50% Injectable 25 Gram(s) IV Push once  dextrose 50% Injectable 25 Gram(s) IV Push once  dextrose 50% Injectable 12.5 Gram(s) IV Push once  ferrous    sulfate 325 milliGRAM(s) Oral daily  glucagon  Injectable 1 milliGRAM(s) IntraMuscular once  heparin   Injectable 5000 Unit(s) SubCutaneous every 12 hours  hydrochlorothiazide 12.5 milliGRAM(s) Oral daily  influenza  Vaccine (HIGH DOSE) 0.7 milliLiter(s) IntraMuscular once  insulin lispro (ADMELOG) corrective regimen sliding scale   SubCutaneous three times a day before meals  insulin lispro (ADMELOG) corrective regimen sliding scale   SubCutaneous at bedtime  losartan 100 milliGRAM(s) Oral daily  multivitamin 1 Tablet(s) Oral daily  pantoprazole    Tablet 40 milliGRAM(s) Oral before breakfast  senna 2 Tablet(s) Oral at bedtime      Assessment:  78y F pmh htn, hld, dm, p/w  2-3 days of substernal non- radiating CP, SOB, and progressive CHEATHAM x past few weeks    Recs:  cardiac stable  no e/o acs by ecg or biomarkers  elevated d-dimer --> CTA chest neg for PE and lower ext venous duplex neg for DVT  cardiac cath pending   TACOS, likely MARGOT ==> improving  vol status acceptabe, cxr clear, bnp level normal for age = cw hctz 12.5mg for improved bp control and e/o elevated LA-P on echo  s/p echo = normal lv/rv fxn, + diastolic dysfunction  anemia workup, fobt neg, gi following  will follow          Over 25 minutes spent on total encounter; more than 50% of the visit was spent counseling and/or coordinating care by the attending physician.      Beny Pryor MD   Cardiovascular Disease  [phone] Cardiovascular Disease Progress Note    Overnight events: No acute events overnight.  no new cardiac sx  Otherwise review of systems negative    Objective Findings:  T(C): 36.6 (09-19-23 @ 04:45), Max: 36.8 (09-18-23 @ 20:39)  HR: 77 (09-19-23 @ 04:45) (69 - 77)  BP: 140/71 (09-19-23 @ 04:45) (140/71 - 184/70)  RR: 18 (09-19-23 @ 04:45) (16 - 18)  SpO2: 97% (09-19-23 @ 04:45) (97% - 98%)  Wt(kg): --  Daily Height in cm: 149.86 (18 Sep 2023 11:11)    Daily       Physical Exam:  Gen: NAD  HEENT: EOMI  CV: RRR, normal S1 + S2, no m/r/g  Lungs: CTAB  Abd: soft, non-tender  Ext: No edema    Telemetry:    Laboratory Data:                        8.5    6.06  )-----------( 168      ( 19 Sep 2023 06:15 )             27.7     09-19    139  |  104  |  29<H>  ----------------------------<  94  3.7   |  23  |  1.33<H>    Ca    11.2<H>      19 Sep 2023 06:15                Inpatient Medications:  MEDICATIONS  (STANDING):  ascorbic acid 500 milliGRAM(s) Oral daily  aspirin enteric coated 81 milliGRAM(s) Oral daily  atorvastatin 80 milliGRAM(s) Oral at bedtime  chlorhexidine 2% Cloths 1 Application(s) Topical <User Schedule>  dextrose 5%. 1000 milliLiter(s) (100 mL/Hr) IV Continuous <Continuous>  dextrose 5%. 1000 milliLiter(s) (50 mL/Hr) IV Continuous <Continuous>  dextrose 50% Injectable 25 Gram(s) IV Push once  dextrose 50% Injectable 25 Gram(s) IV Push once  dextrose 50% Injectable 12.5 Gram(s) IV Push once  ferrous    sulfate 325 milliGRAM(s) Oral daily  glucagon  Injectable 1 milliGRAM(s) IntraMuscular once  heparin   Injectable 5000 Unit(s) SubCutaneous every 12 hours  hydrochlorothiazide 12.5 milliGRAM(s) Oral daily  influenza  Vaccine (HIGH DOSE) 0.7 milliLiter(s) IntraMuscular once  insulin lispro (ADMELOG) corrective regimen sliding scale   SubCutaneous three times a day before meals  insulin lispro (ADMELOG) corrective regimen sliding scale   SubCutaneous at bedtime  losartan 100 milliGRAM(s) Oral daily  multivitamin 1 Tablet(s) Oral daily  pantoprazole    Tablet 40 milliGRAM(s) Oral before breakfast  senna 2 Tablet(s) Oral at bedtime      Assessment:  78y F pmh htn, hld, dm, p/w  2-3 days of substernal non- radiating CP, SOB, and progressive CHEATHAM x past few weeks    Recs:  cardiac stable  no e/o acs by ecg or biomarkers  elevated d-dimer --> CTA chest neg for PE and lower ext venous duplex neg for DVT  cardiac cath pending   TACOS, likely MARGOT ==> improving  vol status acceptabe, cxr clear, bnp level normal for age = cw hctz 12.5mg for improved bp control and e/o elevated LA-P on echo  s/p echo = normal lv/rv fxn, + diastolic dysfunction  anemia workup, fobt neg, gi following  will follow          Over 25 minutes spent on total encounter; more than 50% of the visit was spent counseling and/or coordinating care by the attending physician.      Beny Pryor MD   Cardiovascular Disease  [phone]

## 2023-09-19 NOTE — PROGRESS NOTE ADULT - SUBJECTIVE AND OBJECTIVE BOX
Patient is a 78y old  Female who presents with a chief complaint of CP. CHEATHAM (19 Sep 2023 12:09)      SUBJECTIVE / OVERNIGHT EVENTS: No new complaints. s/p transfusion PRBC  Review of Systems  chest pain no  palpitations no  sob no  nausea no  headache no    MEDICATIONS  (STANDING):  ascorbic acid 500 milliGRAM(s) Oral daily  aspirin enteric coated 81 milliGRAM(s) Oral daily  atorvastatin 80 milliGRAM(s) Oral at bedtime  chlorhexidine 2% Cloths 1 Application(s) Topical <User Schedule>  dextrose 5%. 1000 milliLiter(s) (50 mL/Hr) IV Continuous <Continuous>  dextrose 5%. 1000 milliLiter(s) (100 mL/Hr) IV Continuous <Continuous>  dextrose 50% Injectable 25 Gram(s) IV Push once  dextrose 50% Injectable 25 Gram(s) IV Push once  dextrose 50% Injectable 12.5 Gram(s) IV Push once  ferrous    sulfate 325 milliGRAM(s) Oral daily  glucagon  Injectable 1 milliGRAM(s) IntraMuscular once  heparin   Injectable 5000 Unit(s) SubCutaneous every 12 hours  hydrochlorothiazide 12.5 milliGRAM(s) Oral daily  influenza  Vaccine (HIGH DOSE) 0.7 milliLiter(s) IntraMuscular once  insulin lispro (ADMELOG) corrective regimen sliding scale   SubCutaneous at bedtime  insulin lispro (ADMELOG) corrective regimen sliding scale   SubCutaneous three times a day before meals  losartan 100 milliGRAM(s) Oral daily  multivitamin 1 Tablet(s) Oral daily  pantoprazole    Tablet 40 milliGRAM(s) Oral before breakfast  senna 2 Tablet(s) Oral at bedtime    MEDICATIONS  (PRN):  acetaminophen     Tablet .. 650 milliGRAM(s) Oral every 6 hours PRN Temp greater or equal to 38C (100.4F), Mild Pain (1 - 3)  aluminum hydroxide/magnesium hydroxide/simethicone Suspension 30 milliLiter(s) Oral every 4 hours PRN Dyspepsia  dextrose Oral Gel 15 Gram(s) Oral once PRN Blood Glucose LESS THAN 70 milliGRAM(s)/deciliter  melatonin 3 milliGRAM(s) Oral at bedtime PRN Insomnia  ondansetron Injectable 4 milliGRAM(s) IV Push every 8 hours PRN Nausea and/or Vomiting      Vital Signs Last 24 Hrs  T(C): 36.7 (19 Sep 2023 15:13), Max: 36.8 (18 Sep 2023 20:39)  T(F): 98 (19 Sep 2023 15:13), Max: 98.2 (18 Sep 2023 20:39)  HR: 64 (19 Sep 2023 17:37) (64 - 77)  BP: 163/70 (19 Sep 2023 17:37) (135/66 - 172/79)  BP(mean): --  RR: 18 (19 Sep 2023 17:37) (18 - 18)  SpO2: 99% (19 Sep 2023 17:37) (97% - 99%)    Parameters below as of 19 Sep 2023 15:13  Patient On (Oxygen Delivery Method): room air        PHYSICAL EXAM:  GENERAL: NAD, well-developed  HEAD:  Atraumatic, Normocephalic  EYES: EOMI, PERRLA, conjunctiva and sclera clear  NECK: Supple, No JVD  CHEST/LUNG: Clear to auscultation bilaterally; No wheeze  HEART: Regular rate and rhythm; No murmurs, rubs, or gallops  ABDOMEN: Soft, Nontender, Nondistended; Bowel sounds present  EXTREMITIES:  2+ Peripheral Pulses, No clubbing, cyanosis, or edema  PSYCH: AAOx3  NEUROLOGY: non-focal  SKIN: No rashes or lesions    LABS:                        8.5    6.06  )-----------( 168      ( 19 Sep 2023 06:15 )             27.7     09-19    139  |  104  |  29<H>  ----------------------------<  94  3.7   |  23  |  1.33<H>    Ca    11.2<H>      19 Sep 2023 06:15            Urinalysis Basic - ( 19 Sep 2023 06:15 )    Color: x / Appearance: x / SG: x / pH: x  Gluc: 94 mg/dL / Ketone: x  / Bili: x / Urobili: x   Blood: x / Protein: x / Nitrite: x   Leuk Esterase: x / RBC: x / WBC x   Sq Epi: x / Non Sq Epi: x / Bacteria: x          RADIOLOGY & ADDITIONAL TESTS:    Imaging Personally Reviewed:    Consultant(s) Notes Reviewed:      Care Discussed with Consultants/Other Providers:

## 2023-09-19 NOTE — PRE-OP CHECKLIST - TEMPERATURE IN CELSIUS (DEGREES C)
Patient is calling because he has some stiches currently on his right thumb nail and he stated that he is going to be leaving out of time and wanted to know if he could come in sometime this week to get the stiches out.     patient can be reached at [phone]  
36.6
36.7

## 2023-09-19 NOTE — PROGRESS NOTE ADULT - ASSESSMENT
78y F pmh htn, hld, dm, p/w  2-3 days of substernal non- radiating CP, SOB, and progressive CHEATHAM x past few weeks admitted for CP w/u    Angina pectoris.   - Obese pt with multiple risk factor ( htn, DM) present  for CP, sob  - Afebrile, VSS, clinically nontoxic   - EKG: NSR   - CXR neg   - Telemetry   - Echo   - Cath reschedule    - Cardio follow Beny Pryor    Anemia iron deficiency  - GI follow  - Guaiac negative  - supplement iron   - s/p transfusion 1 PRBC    D Dimer elevated  - CTa no embolus  - LED no DVT    HTN (hypertension).   - losartan 100mg   - Monitor.    HLD (hyperlipidemia).   - c/w statin.    DM2 (diabetes mellitus, type 2).   - Hold home Metformin   - ISS ACHS  - Check FS, adjust insulin accordingly   - DASH/CC diet  - Check A1c.    Back pain/ Leg pain  - MRI LS noted  - Spine service evaluation    Prophylactic measure.   - DVT ppx: Heparin sq  - GI ppx: PPI   - Diet: DASH/ CC.    d/w patient     Brock Randolph MD phone [phone]  78y F pmh htn, hld, dm, p/w  2-3 days of substernal non- radiating CP, SOB, and progressive CHEATHAM x past few weeks admitted for CP w/u    Angina pectoris.   - Obese pt with multiple risk factor ( htn, DM) present  for CP, sob  - Afebrile, VSS, clinically nontoxic   - EKG: NSR   - CXR neg   - Telemetry   - Echo   - Cath reschedule    - Cardio follow Beny Pryor    Anemia iron deficiency  - GI follow  - Guaiac negative  - supplement iron   - s/p transfusion 1 PRBC    D Dimer elevated  - CTa no embolus  - LED no DVT    HTN (hypertension).   - losartan 100mg   - Monitor.    HLD (hyperlipidemia).   - c/w statin.    DM2 (diabetes mellitus, type 2).   - Hold home Metformin   - ISS ACHS  - Check FS, adjust insulin accordingly   - DASH/CC diet  - Check A1c.    Back pain/ Leg pain  - MRI LS noted  - Spine service evaluation    Prophylactic measure.   - DVT ppx: Heparin sq  - GI ppx: PPI   - Diet: DASH/ CC.    d/w patient     Brock Randolph MD phone [phone]  78y F pmh htn, hld, dm, p/w  2-3 days of substernal non- radiating CP, SOB, and progressive CHEATHAM x past few weeks admitted for CP w/u    Angina pectoris.   - Obese pt with multiple risk factor ( htn, DM) present  for CP, sob  - Afebrile, VSS, clinically nontoxic   - EKG: NSR   - CXR neg   - Telemetry   - Echo   - Cath reschedule    - Cardio follow Beny Pryor    Anemia iron deficiency  - GI follow  - Guaiac negative  - supplement iron   - s/p transfusion 1 PRBC    D Dimer elevated  - CTa no embolus  - LED no DVT    HTN (hypertension).   - losartan 100mg   - Monitor.    HLD (hyperlipidemia).   - c/w statin.    DM2 (diabetes mellitus, type 2).   - Hold home Metformin   - ISS ACHS  - Check FS, adjust insulin accordingly   - DASH/CC diet  - Check A1c.    Back pain/ Leg pain  - MRI LS noted  - Spine service evaluation    Prophylactic measure.   - DVT ppx: Heparin sq  - GI ppx: PPI   - Diet: DASH/ CC.    d/w patient     Brock Randolph MD phone [phone]

## 2023-09-20 LAB
ANION GAP SERPL CALC-SCNC: 13 MMOL/L — SIGNIFICANT CHANGE UP (ref 5–17)
BUN SERPL-MCNC: 31 MG/DL — HIGH (ref 7–23)
CALCIUM SERPL-MCNC: 11 MG/DL — HIGH (ref 8.4–10.5)
CHLORIDE SERPL-SCNC: 101 MMOL/L — SIGNIFICANT CHANGE UP (ref 96–108)
CO2 SERPL-SCNC: 22 MMOL/L — SIGNIFICANT CHANGE UP (ref 22–31)
CREAT SERPL-MCNC: 1.45 MG/DL — HIGH (ref 0.5–1.3)
EGFR: 37 ML/MIN/1.73M2 — LOW
GLUCOSE BLDC GLUCOMTR-MCNC: 112 MG/DL — HIGH (ref 70–99)
GLUCOSE BLDC GLUCOMTR-MCNC: 125 MG/DL — HIGH (ref 70–99)
GLUCOSE BLDC GLUCOMTR-MCNC: 130 MG/DL — HIGH (ref 70–99)
GLUCOSE BLDC GLUCOMTR-MCNC: 135 MG/DL — HIGH (ref 70–99)
GLUCOSE SERPL-MCNC: 108 MG/DL — HIGH (ref 70–99)
HCT VFR BLD CALC: 25.8 % — LOW (ref 34.5–45)
HGB BLD-MCNC: 7.9 G/DL — LOW (ref 11.5–15.5)
MCHC RBC-ENTMCNC: 25 PG — LOW (ref 27–34)
MCHC RBC-ENTMCNC: 30.6 GM/DL — LOW (ref 32–36)
MCV RBC AUTO: 81.6 FL — SIGNIFICANT CHANGE UP (ref 80–100)
NRBC # BLD: 0 /100 WBCS — SIGNIFICANT CHANGE UP (ref 0–0)
PLATELET # BLD AUTO: 169 K/UL — SIGNIFICANT CHANGE UP (ref 150–400)
POTASSIUM SERPL-MCNC: 3.8 MMOL/L — SIGNIFICANT CHANGE UP (ref 3.5–5.3)
POTASSIUM SERPL-SCNC: 3.8 MMOL/L — SIGNIFICANT CHANGE UP (ref 3.5–5.3)
RBC # BLD: 3.16 M/UL — LOW (ref 3.8–5.2)
RBC # FLD: 17.7 % — HIGH (ref 10.3–14.5)
SODIUM SERPL-SCNC: 136 MMOL/L — SIGNIFICANT CHANGE UP (ref 135–145)
WBC # BLD: 6.94 K/UL — SIGNIFICANT CHANGE UP (ref 3.8–10.5)
WBC # FLD AUTO: 6.94 K/UL — SIGNIFICANT CHANGE UP (ref 3.8–10.5)

## 2023-09-20 RX ADMIN — Medication 81 MILLIGRAM(S): at 11:39

## 2023-09-20 RX ADMIN — PANTOPRAZOLE SODIUM 40 MILLIGRAM(S): 20 TABLET, DELAYED RELEASE ORAL at 05:21

## 2023-09-20 RX ADMIN — LOSARTAN POTASSIUM 100 MILLIGRAM(S): 100 TABLET, FILM COATED ORAL at 05:21

## 2023-09-20 RX ADMIN — Medication 325 MILLIGRAM(S): at 11:39

## 2023-09-20 RX ADMIN — CHLORHEXIDINE GLUCONATE 1 APPLICATION(S): 213 SOLUTION TOPICAL at 05:19

## 2023-09-20 RX ADMIN — HEPARIN SODIUM 5000 UNIT(S): 5000 INJECTION INTRAVENOUS; SUBCUTANEOUS at 17:22

## 2023-09-20 RX ADMIN — ATORVASTATIN CALCIUM 80 MILLIGRAM(S): 80 TABLET, FILM COATED ORAL at 22:10

## 2023-09-20 RX ADMIN — Medication 1 TABLET(S): at 11:38

## 2023-09-20 RX ADMIN — HEPARIN SODIUM 5000 UNIT(S): 5000 INJECTION INTRAVENOUS; SUBCUTANEOUS at 05:20

## 2023-09-20 RX ADMIN — Medication 500 MILLIGRAM(S): at 11:39

## 2023-09-20 RX ADMIN — SENNA PLUS 2 TABLET(S): 8.6 TABLET ORAL at 22:10

## 2023-09-20 NOTE — PROGRESS NOTE ADULT - SUBJECTIVE AND OBJECTIVE BOX
Cardiovascular Disease Progress Note    Overnight events: No acute events overnight. no new cardiac sx   Otherwise review of systems negative    Objective Findings:  T(C): 36.5 (09-20-23 @ 04:23), Max: 36.8 (09-19-23 @ 20:43)  HR: 67 (09-20-23 @ 04:23) (62 - 75)  BP: 99/57 (09-20-23 @ 04:23) (99/57 - 167/80)  RR: 18 (09-20-23 @ 04:23) (17 - 18)  SpO2: 95% (09-20-23 @ 04:23) (95% - 100%)  Wt(kg): --  Daily     Daily       Physical Exam:  Gen: NAD  HEENT: EOMI  CV: RRR, normal S1 + S2, no m/r/g  Lungs: CTAB  Abd: soft, non-tender  Ext: No edema    Telemetry:    Laboratory Data:                        7.9    6.94  )-----------( 169      ( 20 Sep 2023 05:04 )             25.8     09-20    136  |  101  |  31<H>  ----------------------------<  108<H>  3.8   |  22  |  1.45<H>    Ca    11.0<H>      20 Sep 2023 05:04                Inpatient Medications:  MEDICATIONS  (STANDING):  ascorbic acid 500 milliGRAM(s) Oral daily  aspirin enteric coated 81 milliGRAM(s) Oral daily  atorvastatin 80 milliGRAM(s) Oral at bedtime  chlorhexidine 2% Cloths 1 Application(s) Topical <User Schedule>  dextrose 5%. 1000 milliLiter(s) (50 mL/Hr) IV Continuous <Continuous>  dextrose 5%. 1000 milliLiter(s) (100 mL/Hr) IV Continuous <Continuous>  dextrose 50% Injectable 25 Gram(s) IV Push once  dextrose 50% Injectable 25 Gram(s) IV Push once  dextrose 50% Injectable 12.5 Gram(s) IV Push once  ferrous    sulfate 325 milliGRAM(s) Oral daily  glucagon  Injectable 1 milliGRAM(s) IntraMuscular once  heparin   Injectable 5000 Unit(s) SubCutaneous every 12 hours  hydrochlorothiazide 12.5 milliGRAM(s) Oral daily  influenza  Vaccine (HIGH DOSE) 0.7 milliLiter(s) IntraMuscular once  insulin lispro (ADMELOG) corrective regimen sliding scale   SubCutaneous at bedtime  insulin lispro (ADMELOG) corrective regimen sliding scale   SubCutaneous three times a day before meals  losartan 100 milliGRAM(s) Oral daily  multivitamin 1 Tablet(s) Oral daily  pantoprazole    Tablet 40 milliGRAM(s) Oral before breakfast  senna 2 Tablet(s) Oral at bedtime      Assessment:  78y F pmh htn, hld, dm, p/w  2-3 days of substernal non- radiating CP, SOB, and progressive CHEATHAM x past few weeks    Recs:  cardiac stable  no e/o acs by ecg or biomarkers  elevated d-dimer --> CTA chest neg for PE and lower ext venous duplex neg for DVT  s/p cardiac cath --> non obs cad  TACOS, likely MARGOT, consider renal consult  vol status acceptabe, cxr clear, bnp level normal for age = cw hctz 12.5mg for improved bp control and e/o elevated LA-P on echo  s/p echo = normal lv/rv fxn, + diastolic dysfunction  anemia workup, fobt neg, gi following  dcp        Over 25 minutes spent on total encounter; more than 50% of the visit was spent counseling and/or coordinating care by the attending physician.      Beny Pryor MD   Cardiovascular Disease  [phone] Cardiovascular Disease Progress Note    Overnight events: No acute events overnight. no new cardiac sx   Otherwise review of systems negative    Objective Findings:  T(C): 36.5 (09-20-23 @ 04:23), Max: 36.8 (09-19-23 @ 20:43)  HR: 67 (09-20-23 @ 04:23) (62 - 75)  BP: 99/57 (09-20-23 @ 04:23) (99/57 - 167/80)  RR: 18 (09-20-23 @ 04:23) (17 - 18)  SpO2: 95% (09-20-23 @ 04:23) (95% - 100%)  Wt(kg): --  Daily     Daily       Physical Exam:  Gen: NAD  HEENT: EOMI  CV: RRR, normal S1 + S2, no m/r/g  Lungs: CTAB  Abd: soft, non-tender  Ext: No edema    Telemetry:    Laboratory Data:                        7.9    6.94  )-----------( 169      ( 20 Sep 2023 05:04 )             25.8     09-20    136  |  101  |  31<H>  ----------------------------<  108<H>  3.8   |  22  |  1.45<H>    Ca    11.0<H>      20 Sep 2023 05:04                Inpatient Medications:  MEDICATIONS  (STANDING):  ascorbic acid 500 milliGRAM(s) Oral daily  aspirin enteric coated 81 milliGRAM(s) Oral daily  atorvastatin 80 milliGRAM(s) Oral at bedtime  chlorhexidine 2% Cloths 1 Application(s) Topical <User Schedule>  dextrose 5%. 1000 milliLiter(s) (50 mL/Hr) IV Continuous <Continuous>  dextrose 5%. 1000 milliLiter(s) (100 mL/Hr) IV Continuous <Continuous>  dextrose 50% Injectable 25 Gram(s) IV Push once  dextrose 50% Injectable 25 Gram(s) IV Push once  dextrose 50% Injectable 12.5 Gram(s) IV Push once  ferrous    sulfate 325 milliGRAM(s) Oral daily  glucagon  Injectable 1 milliGRAM(s) IntraMuscular once  heparin   Injectable 5000 Unit(s) SubCutaneous every 12 hours  hydrochlorothiazide 12.5 milliGRAM(s) Oral daily  influenza  Vaccine (HIGH DOSE) 0.7 milliLiter(s) IntraMuscular once  insulin lispro (ADMELOG) corrective regimen sliding scale   SubCutaneous at bedtime  insulin lispro (ADMELOG) corrective regimen sliding scale   SubCutaneous three times a day before meals  losartan 100 milliGRAM(s) Oral daily  multivitamin 1 Tablet(s) Oral daily  pantoprazole    Tablet 40 milliGRAM(s) Oral before breakfast  senna 2 Tablet(s) Oral at bedtime      Assessment:  78y F pmh htn, hld, dm, p/w  2-3 days of substernal non- radiating CP, SOB, and progressive CHEATHAM x past few weeks    Recs:  cardiac stable  no e/o acs by ecg or biomarkers  elevated d-dimer --> CTA chest neg for PE and lower ext venous duplex neg for DVT  s/p cardiac cath --> non obs cad  TACOS, likely MARGOT, consider renal consult  vol status acceptabe, cxr clear, bnp level normal for age = cw hctz 12.5mg for improved bp control and e/o elevated LA-P on echo  s/p echo = normal lv/rv fxn, + diastolic dysfunction  anemia workup, fobt neg, gi following  dcp        Over 25 minutes spent on total encounter; more than 50% of the visit was spent counseling and/or coordinating care by the attending physician.      Beny Pryor MD   Cardiovascular Disease  [phone] Cardiovascular Disease Progress Note    Overnight events: No acute events overnight. no new cardiac sx   Otherwise review of systems negative    Objective Findings:  T(C): 36.5 (09-20-23 @ 04:23), Max: 36.8 (09-19-23 @ 20:43)  HR: 67 (09-20-23 @ 04:23) (62 - 75)  BP: 99/57 (09-20-23 @ 04:23) (99/57 - 167/80)  RR: 18 (09-20-23 @ 04:23) (17 - 18)  SpO2: 95% (09-20-23 @ 04:23) (95% - 100%)  Wt(kg): --  Daily     Daily       Physical Exam:  Gen: NAD  HEENT: EOMI  CV: RRR, normal S1 + S2, no m/r/g  Lungs: CTAB  Abd: soft, non-tender  Ext: No edema    Telemetry:    Laboratory Data:                        7.9    6.94  )-----------( 169      ( 20 Sep 2023 05:04 )             25.8     09-20    136  |  101  |  31<H>  ----------------------------<  108<H>  3.8   |  22  |  1.45<H>    Ca    11.0<H>      20 Sep 2023 05:04                Inpatient Medications:  MEDICATIONS  (STANDING):  ascorbic acid 500 milliGRAM(s) Oral daily  aspirin enteric coated 81 milliGRAM(s) Oral daily  atorvastatin 80 milliGRAM(s) Oral at bedtime  chlorhexidine 2% Cloths 1 Application(s) Topical <User Schedule>  dextrose 5%. 1000 milliLiter(s) (50 mL/Hr) IV Continuous <Continuous>  dextrose 5%. 1000 milliLiter(s) (100 mL/Hr) IV Continuous <Continuous>  dextrose 50% Injectable 25 Gram(s) IV Push once  dextrose 50% Injectable 25 Gram(s) IV Push once  dextrose 50% Injectable 12.5 Gram(s) IV Push once  ferrous    sulfate 325 milliGRAM(s) Oral daily  glucagon  Injectable 1 milliGRAM(s) IntraMuscular once  heparin   Injectable 5000 Unit(s) SubCutaneous every 12 hours  hydrochlorothiazide 12.5 milliGRAM(s) Oral daily  influenza  Vaccine (HIGH DOSE) 0.7 milliLiter(s) IntraMuscular once  insulin lispro (ADMELOG) corrective regimen sliding scale   SubCutaneous at bedtime  insulin lispro (ADMELOG) corrective regimen sliding scale   SubCutaneous three times a day before meals  losartan 100 milliGRAM(s) Oral daily  multivitamin 1 Tablet(s) Oral daily  pantoprazole    Tablet 40 milliGRAM(s) Oral before breakfast  senna 2 Tablet(s) Oral at bedtime      Assessment:  78y F pmh htn, hld, dm, p/w  2-3 days of substernal non- radiating CP, SOB, and progressive CHEATHAM x past few weeks    Recs:  cardiac stable  no e/o acs by ecg or biomarkers  elevated d-dimer --> CTA chest neg for PE and lower ext venous duplex neg for DVT  s/p cardiac cath --> non obs cad  TACOS, likely MARGOT, consider renal consult  vol status acceptabe, cxr clear, bnp level normal for age = cw hctz 12.5mg for improved bp control and e/o elevated LA-P on echo  s/p echo = normal lv/rv fxn, + diastolic dysfunction  anemia workup, fobt neg, gi following  dcp        Over 25 minutes spent on total encounter; more than 50% of the visit was spent counseling and/or coordinating care by the attending physician.      Beny Pryor MD   Cardiovascular Disease  [phone]

## 2023-09-20 NOTE — PROGRESS NOTE ADULT - SUBJECTIVE AND OBJECTIVE BOX
Patient is a 78y old  Female who presents with a chief complaint of CP. CHEATHAM (20 Sep 2023 12:02)      SUBJECTIVE / OVERNIGHT EVENTS: Comfortable without new complaints.   Review of Systems  chest pain no  palpitations no  sob no  nausea no  headache no    MEDICATIONS  (STANDING):  ascorbic acid 500 milliGRAM(s) Oral daily  aspirin enteric coated 81 milliGRAM(s) Oral daily  atorvastatin 80 milliGRAM(s) Oral at bedtime  chlorhexidine 2% Cloths 1 Application(s) Topical <User Schedule>  dextrose 5%. 1000 milliLiter(s) (50 mL/Hr) IV Continuous <Continuous>  dextrose 5%. 1000 milliLiter(s) (100 mL/Hr) IV Continuous <Continuous>  dextrose 50% Injectable 12.5 Gram(s) IV Push once  dextrose 50% Injectable 25 Gram(s) IV Push once  dextrose 50% Injectable 25 Gram(s) IV Push once  ferrous    sulfate 325 milliGRAM(s) Oral daily  glucagon  Injectable 1 milliGRAM(s) IntraMuscular once  heparin   Injectable 5000 Unit(s) SubCutaneous every 12 hours  hydrochlorothiazide 12.5 milliGRAM(s) Oral daily  influenza  Vaccine (HIGH DOSE) 0.7 milliLiter(s) IntraMuscular once  insulin lispro (ADMELOG) corrective regimen sliding scale   SubCutaneous at bedtime  insulin lispro (ADMELOG) corrective regimen sliding scale   SubCutaneous three times a day before meals  losartan 100 milliGRAM(s) Oral daily  multivitamin 1 Tablet(s) Oral daily  pantoprazole    Tablet 40 milliGRAM(s) Oral before breakfast  senna 2 Tablet(s) Oral at bedtime    MEDICATIONS  (PRN):  acetaminophen     Tablet .. 650 milliGRAM(s) Oral every 6 hours PRN Temp greater or equal to 38C (100.4F), Mild Pain (1 - 3)  aluminum hydroxide/magnesium hydroxide/simethicone Suspension 30 milliLiter(s) Oral every 4 hours PRN Dyspepsia  dextrose Oral Gel 15 Gram(s) Oral once PRN Blood Glucose LESS THAN 70 milliGRAM(s)/deciliter  melatonin 3 milliGRAM(s) Oral at bedtime PRN Insomnia  ondansetron Injectable 4 milliGRAM(s) IV Push every 8 hours PRN Nausea and/or Vomiting      Vital Signs Last 24 Hrs  T(C): 36.6 (20 Sep 2023 11:22), Max: 36.8 (19 Sep 2023 20:43)  T(F): 97.9 (20 Sep 2023 11:22), Max: 98.2 (19 Sep 2023 20:43)  HR: 63 (20 Sep 2023 17:03) (62 - 75)  BP: 110/64 (20 Sep 2023 17:03) (99/57 - 167/80)  BP(mean): --  RR: 18 (20 Sep 2023 11:22) (17 - 18)  SpO2: 97% (20 Sep 2023 11:22) (95% - 100%)    Parameters below as of 20 Sep 2023 11:22  Patient On (Oxygen Delivery Method): room air        PHYSICAL EXAM:  GENERAL: NAD   HEAD:  Atraumatic, Normocephalic  EYES: EOMI, PERRLA, conjunctiva and sclera clear  NECK: Supple, No JVD  CHEST/LUNG: Clear to auscultation bilaterally; No wheeze  HEART: Regular rate and rhythm; No murmurs, rubs, or gallops  ABDOMEN: Soft, Nontender, Nondistended; Bowel sounds present  EXTREMITIES:  2+ Peripheral Pulses, No clubbing, cyanosis, or edema  PSYCH: AAOx3  NEUROLOGY: non-focal  SKIN: No rashes or lesions    LABS:                        7.9    6.94  )-----------( 169      ( 20 Sep 2023 05:04 )             25.8     09-20    136  |  101  |  31<H>  ----------------------------<  108<H>  3.8   |  22  |  1.45<H>    Ca    11.0<H>      20 Sep 2023 05:04            Urinalysis Basic - ( 20 Sep 2023 05:04 )    Color: x / Appearance: x / SG: x / pH: x  Gluc: 108 mg/dL / Ketone: x  / Bili: x / Urobili: x   Blood: x / Protein: x / Nitrite: x   Leuk Esterase: x / RBC: x / WBC x   Sq Epi: x / Non Sq Epi: x / Bacteria: x          RADIOLOGY & ADDITIONAL TESTS:    Imaging Personally Reviewed:    Consultant(s) Notes Reviewed:      Care Discussed with Consultants/Other Providers:

## 2023-09-20 NOTE — PROGRESS NOTE ADULT - ASSESSMENT
78y F pmh htn, hld, dm, p/w  2-3 days of substernal non- radiating CP, SOB, and progressive CHEATHAM x past few weeks admitted for CP w/u    Angina pectoris.   - Obese pt with multiple risk factor ( htn, DM) present  for CP, sob  - Afebrile, VSS, clinically nontoxic   - EKG: NSR   - CXR neg   - Telemetry   - Echo noted  - Cath no obstructive disease  - Cardio follow Benydenzel Pryor    Anemia iron deficiency  - GI follow  - Guaiac negative  - supplement iron   - s/p transfusion 1 PRBC  - await decision re EGD/ Colon     D Dimer elevated  - CTa no embolus  - LED no DVT    HTN (hypertension).   - losartan 100mg   - Monitor.    HLD (hyperlipidemia).   - c/w statin.    DM2 (diabetes mellitus, type 2).   - Hold home Metformin   - ISS ACHS  - Check FS, adjust insulin accordingly   - DASH/CC diet  - Check A1c.    Back pain/ Leg pain  - MRI LS noted  - Spine service evaluation    Prophylactic measure.   - DVT ppx: Heparin sq  - GI ppx: PPI   - Diet: DASH/ CC.    d/w patient and ACP    Brock Randolph MD phone [phone]  78y F pmh htn, hld, dm, p/w  2-3 days of substernal non- radiating CP, SOB, and progressive CHEATHAM x past few weeks admitted for CP w/u    Angina pectoris.   - Obese pt with multiple risk factor ( htn, DM) present  for CP, sob  - Afebrile, VSS, clinically nontoxic   - EKG: NSR   - CXR neg   - Telemetry   - Echo noted  - Cath no obstructive disease  - Cardio follow Benydenzel Pryor    Anemia iron deficiency  - GI follow  - Guaiac negative  - supplement iron   - s/p transfusion 1 PRBC  - await decision re EGD/ Colon     D Dimer elevated  - CTa no embolus  - LED no DVT    HTN (hypertension).   - losartan 100mg   - Monitor.    HLD (hyperlipidemia).   - c/w statin.    DM2 (diabetes mellitus, type 2).   - Hold home Metformin   - ISS ACHS  - Check FS, adjust insulin accordingly   - DASH/CC diet  - Check A1c.    Back pain/ Leg pain  - MRI LS noted  - Spine service evaluation    Prophylactic measure.   - DVT ppx: Heparin sq  - GI ppx: PPI   - Diet: DASH/ CC.    d/w patient and ACP    Brock Randolph MD phone [phone]  78y F pmh htn, hld, dm, p/w  2-3 days of substernal non- radiating CP, SOB, and progressive CHEATHAM x past few weeks admitted for CP w/u    Angina pectoris.   - Obese pt with multiple risk factor ( htn, DM) present  for CP, sob  - Afebrile, VSS, clinically nontoxic   - EKG: NSR   - CXR neg   - Telemetry   - Echo noted  - Cath no obstructive disease  - Cardio follow Benydenzel Pryor    Anemia iron deficiency  - GI follow  - Guaiac negative  - supplement iron   - s/p transfusion 1 PRBC  - await decision re EGD/ Colon     D Dimer elevated  - CTa no embolus  - LED no DVT    HTN (hypertension).   - losartan 100mg   - Monitor.    HLD (hyperlipidemia).   - c/w statin.    DM2 (diabetes mellitus, type 2).   - Hold home Metformin   - ISS ACHS  - Check FS, adjust insulin accordingly   - DASH/CC diet  - Check A1c.    Back pain/ Leg pain  - MRI LS noted  - Spine service evaluation    Prophylactic measure.   - DVT ppx: Heparin sq  - GI ppx: PPI   - Diet: DASH/ CC.    d/w patient and ACP    Brock Randolph MD phone [phone]

## 2023-09-20 NOTE — PROGRESS NOTE ADULT - ASSESSMENT
a/p anemia      daily cbc   transfuse prn   iron studies noted; would benefit from supplemental iron  ppi once a day  occult negative  s/p cath   cardiology following  will follow  d/w pt     I reviewed the overnight course of events on the unit, re-confirming the patient history. I discussed the care with the patient and their family  The plan of care was discussed with the physician assistant and modifications were made to the notation where appropriate.   Differential diagnosis and plan of care discussed with patient after the evaluation  50 minutes spent on total encounter of which more than fifty percent of the encounter was spent counseling and/or coordinating care by the attending physician.  Advanced care planning was discussed with patient and family.  Advanced care planning forms were reviewed and discussed.  Risks, benefits and alternatives of gastroenterologic procedures were discussed in detail and all questions were answered.

## 2023-09-20 NOTE — CHART NOTE - NSCHARTNOTEFT_GEN_A_CORE
F/u on cath site    78 yof Hx of HTN on losartan, DM on metformin, HLD presenting with 2-3 days of substernal chest pain non-radiating with shortness of breath. Past few weeks has had worsening progressive exertional dyspnea, only able to tolerate 10-15 feet before becoming winded. No orthopnea. Seen by PCP this morning who was concerned about her chest pain, blood pressure and thyroid tests? Was sent to cardiology Dr. Beny Pryor who sent to ED for likely stress/echo and possible cath.  S/p attempted cardiac cath 9/19 via RRA and RFA but unable to pass through.    Pt seen and evaluated at bedside; pt reporting pain at RRA cath site, pt characterizes pain as cramp-like, rated as a 6/10 in severity, absent radiation, intermittent in timing. Pt with no other acute complaints; denying active chest pain, shortness of breath, abdominal pain, headache, dizziness, or chills.    Vital Signs Last 24 Hrs  T(C): 36.6 (20 Sep 2023 00:07), Max: 36.8 (19 Sep 2023 20:43)  T(F): 97.8 (20 Sep 2023 00:07), Max: 98.2 (19 Sep 2023 20:43)  HR: 68 (20 Sep 2023 00:07) (62 - 77)  BP: 125/77 (20 Sep 2023 00:07) (125/77 - 167/80)  BP(mean): --  RR: 18 (20 Sep 2023 00:07) (17 - 18)  SpO2: 97% (20 Sep 2023 00:07) (95% - 100%)    Parameters below as of 20 Sep 2023 00:07  Patient On (Oxygen Delivery Method): room air    LABS 9/19/2023               8.5    6.06  )-----------( 168      ( 19 Sep 2023 06:15 )             27.7     19 Sep 2023 06:15    139    |  104    |  29     ----------------------------<  94     3.7     |  23     |  1.33     Ca    11.2       19 Sep 2023 06:15    CAPILLARY BLOOD GLUCOSE  POCT Blood Glucose.: 141 mg/dL (19 Sep 2023 21:31)  POCT Blood Glucose.: 103 mg/dL (19 Sep 2023 19:16)  POCT Blood Glucose.: 101 mg/dL (19 Sep 2023 11:47)  POCT Blood Glucose.: 102 mg/dL (19 Sep 2023 07:33)    Urinalysis Basic - ( 19 Sep 2023 06:15 )    Color: x / Appearance: x / SG: x / pH: x  Gluc: 94 mg/dL / Ketone: x  / Bili: x / Urobili: x   Blood: x / Protein: x / Nitrite: x   Leuk Esterase: x / RBC: x / WBC x   Sq Epi: x / Non Sq Epi: x / Bacteria: x    Physical Exam  General: Elderly female resting in bed with complaints of 6/10 RRA cath site pain.  CV: +S1, S2, Regular Rate and Rhythm  Pulm: Clear to auscultation bilateral lung fields  GI: +BS in all 4 quadrants, abdomen soft, non-tender, non-distended  Ext: 2+ radial pulses bilateral upper extremities, 2+ femoral pulses bilateral lower extremities; both cath sites clean, dry and intact, absent active bleeding. Mild pain noted to right radial artery cath site upon light palpation, no evidence of hematoma; scattered ecchymoses absent expansion.    #Pain at RRA cath site likely vasospastic in nature post procedural  - Pt is s/p failed cardiac cath 9/19/2023 via RRA and RFA  - Physical exam as above  - Continue pain control  - Monitor pulses and site checks; monitor for continued/worsened pain  - Endorsed to RN  - Will endorse to AM team

## 2023-09-20 NOTE — PROGRESS NOTE ADULT - SUBJECTIVE AND OBJECTIVE BOX
INTERVAL HPI/OVERNIGHT EVENTS:  pt seen and examined No new overnight event.    s/p cath   slight decline in hgb     Allergies    No Known Allergies    Intolerances          General:  No wt loss, fevers, chills, night sweats, fatigue,   Eyes:  Good vision, no reported pain  ENT:  No sore throat, pain, runny nose, dysphagia  CV:  No pain, palpitations, hypo/hypertension  Resp:  No dyspnea, cough, tachypnea, wheezing  GI:  No pain, No nausea, No vomiting, No diarrhea, No constipation, No weight loss, No fever, No pruritis, No rectal bleeding, No tarry stools, No dysphagia,  :  No pain, bleeding, incontinence, nocturia  Muscle:  No pain, weakness  Neuro:  No weakness, tingling, memory problems  Psych:  No fatigue, insomnia, mood problems, depression  Endocrine:  No polyuria, polydipsia, cold/heat intolerance  Heme:  No petechiae, ecchymosis, easy bruisability  Skin:  No rash, tattoos, scars, edema      PHYSICAL EXAM:   Vital Signs Last 24 Hrs  T(C): 36.6 (20 Sep 2023 11:22), Max: 36.8 (19 Sep 2023 20:43)  T(F): 97.9 (20 Sep 2023 11:22), Max: 98.2 (19 Sep 2023 20:43)  HR: 62 (20 Sep 2023 11:22) (62 - 75)  BP: 115/63 (20 Sep 2023 11:22) (99/57 - 167/80)  BP(mean): --  RR: 18 (20 Sep 2023 11:22) (17 - 18)  SpO2: 97% (20 Sep 2023 11:22) (95% - 100%)    Parameters below as of 20 Sep 2023 11:22  Patient On (Oxygen Delivery Method): room air        Daily     Daily  I&O's Summary    19 Sep 2023 07:01  -  20 Sep 2023 07:00  --------------------------------------------------------  IN: 480 mL / OUT: 0 mL / NET: 480 mL          GENERAL:  Appears stated age, well-groomed, well-nourished, no distress  HEENT:  NC/AT,  conjunctivae clear and pink, no thyromegaly, nodules, adenopathy, no JVD, sclera -anicteric  CHEST:  Full & symmetric excursion, no increased effort, breath sounds clear  HEART:  Regular rhythm, S1, S2, no murmur/rub/S3/S4, no abdominal bruit, no edema  ABDOMEN:  Soft, non-tender, non-distended, normoactive bowel sounds,  no masses ,no hepato-splenomegaly, no signs of chronic liver disease  EXTEREMITIES:  no cyanosis,clubbing or edema  SKIN:  No rash/erythema/ecchymoses/petechiae/wounds/abscess/warm/dry  NEURO:  Alert, oriented, no asterixis, no tremor, no encephalopathy      LABS:                        7.9    6.94  )-----------( 169      ( 20 Sep 2023 05:04 )             25.8   09-20    136  |  101  |  31<H>  ----------------------------<  108<H>  3.8   |  22  |  1.45<H>    Ca    11.0<H>      20 Sep 2023 05:04        Urinalysis Basic - ( 17 Sep 2023 07:10 )    Color: x / Appearance: x / SG: x / pH: x  Gluc: 107 mg/dL / Ketone: x  / Bili: x / Urobili: x   Blood: x / Protein: x / Nitrite: x   Leuk Esterase: x / RBC: x / WBC x   Sq Epi: x / Non Sq Epi: x / Bacteria: x      amylase   lipase  RADIOLOGY & ADDITIONAL TESTS:

## 2023-09-21 ENCOUNTER — TRANSCRIPTION ENCOUNTER (OUTPATIENT)
Age: 78
End: 2023-09-21

## 2023-09-21 VITALS
RESPIRATION RATE: 18 BRPM | OXYGEN SATURATION: 98 % | DIASTOLIC BLOOD PRESSURE: 62 MMHG | SYSTOLIC BLOOD PRESSURE: 144 MMHG | HEART RATE: 63 BPM | TEMPERATURE: 98 F

## 2023-09-21 LAB
ANION GAP SERPL CALC-SCNC: 12 MMOL/L — SIGNIFICANT CHANGE UP (ref 5–17)
BUN SERPL-MCNC: 32 MG/DL — HIGH (ref 7–23)
CALCIUM SERPL-MCNC: 11.1 MG/DL — HIGH (ref 8.4–10.5)
CHLORIDE SERPL-SCNC: 102 MMOL/L — SIGNIFICANT CHANGE UP (ref 96–108)
CO2 SERPL-SCNC: 21 MMOL/L — LOW (ref 22–31)
CREAT SERPL-MCNC: 1.31 MG/DL — HIGH (ref 0.5–1.3)
EGFR: 42 ML/MIN/1.73M2 — LOW
GLUCOSE BLDC GLUCOMTR-MCNC: 104 MG/DL — HIGH (ref 70–99)
GLUCOSE BLDC GLUCOMTR-MCNC: 119 MG/DL — HIGH (ref 70–99)
GLUCOSE BLDC GLUCOMTR-MCNC: 95 MG/DL — SIGNIFICANT CHANGE UP (ref 70–99)
GLUCOSE SERPL-MCNC: 98 MG/DL — SIGNIFICANT CHANGE UP (ref 70–99)
HCT VFR BLD CALC: 27.4 % — LOW (ref 34.5–45)
HGB BLD-MCNC: 8.3 G/DL — LOW (ref 11.5–15.5)
MAGNESIUM SERPL-MCNC: 1.9 MG/DL — SIGNIFICANT CHANGE UP (ref 1.6–2.6)
MCHC RBC-ENTMCNC: 25.5 PG — LOW (ref 27–34)
MCHC RBC-ENTMCNC: 30.3 GM/DL — LOW (ref 32–36)
MCV RBC AUTO: 84 FL — SIGNIFICANT CHANGE UP (ref 80–100)
NRBC # BLD: 0 /100 WBCS — SIGNIFICANT CHANGE UP (ref 0–0)
PHOSPHATE SERPL-MCNC: 3.5 MG/DL — SIGNIFICANT CHANGE UP (ref 2.5–4.5)
PLATELET # BLD AUTO: 169 K/UL — SIGNIFICANT CHANGE UP (ref 150–400)
POTASSIUM SERPL-MCNC: 3.6 MMOL/L — SIGNIFICANT CHANGE UP (ref 3.5–5.3)
POTASSIUM SERPL-SCNC: 3.6 MMOL/L — SIGNIFICANT CHANGE UP (ref 3.5–5.3)
RBC # BLD: 3.26 M/UL — LOW (ref 3.8–5.2)
RBC # FLD: 18.4 % — HIGH (ref 10.3–14.5)
SODIUM SERPL-SCNC: 135 MMOL/L — SIGNIFICANT CHANGE UP (ref 135–145)
WBC # BLD: 5.86 K/UL — SIGNIFICANT CHANGE UP (ref 3.8–10.5)
WBC # FLD AUTO: 5.86 K/UL — SIGNIFICANT CHANGE UP (ref 3.8–10.5)

## 2023-09-21 RX ORDER — HYDROCHLOROTHIAZIDE 25 MG
1 TABLET ORAL
Qty: 30 | Refills: 0
Start: 2023-09-21 | End: 2023-10-20

## 2023-09-21 RX ORDER — FERROUS SULFATE 325(65) MG
1 TABLET ORAL
Qty: 30 | Refills: 0
Start: 2023-09-21 | End: 2023-10-20

## 2023-09-21 RX ADMIN — CHLORHEXIDINE GLUCONATE 1 APPLICATION(S): 213 SOLUTION TOPICAL at 05:26

## 2023-09-21 RX ADMIN — Medication 325 MILLIGRAM(S): at 12:08

## 2023-09-21 RX ADMIN — Medication 81 MILLIGRAM(S): at 12:09

## 2023-09-21 RX ADMIN — HEPARIN SODIUM 5000 UNIT(S): 5000 INJECTION INTRAVENOUS; SUBCUTANEOUS at 05:43

## 2023-09-21 RX ADMIN — Medication 500 MILLIGRAM(S): at 12:09

## 2023-09-21 RX ADMIN — Medication 1 TABLET(S): at 12:08

## 2023-09-21 RX ADMIN — PANTOPRAZOLE SODIUM 40 MILLIGRAM(S): 20 TABLET, DELAYED RELEASE ORAL at 05:42

## 2023-09-21 RX ADMIN — LOSARTAN POTASSIUM 100 MILLIGRAM(S): 100 TABLET, FILM COATED ORAL at 05:43

## 2023-09-21 RX ADMIN — Medication 650 MILLIGRAM(S): at 12:10

## 2023-09-21 RX ADMIN — HEPARIN SODIUM 5000 UNIT(S): 5000 INJECTION INTRAVENOUS; SUBCUTANEOUS at 17:07

## 2023-09-21 RX ADMIN — Medication 650 MILLIGRAM(S): at 13:10

## 2023-09-21 NOTE — PROGRESS NOTE ADULT - ASSESSMENT
a/p anemia    daily cbc   transfuse prn   iron studies noted; would benefit from supplemental iron  ppi once a day  occult negative; no bleeding   s/p cath   pt can f/u as outpatient for EGD/colonoscopy  no GI objection to dc planning   d/w pt   d/w medicine     I reviewed the overnight course of events on the unit, re-confirming the patient history. I discussed the care with the patient and their family  The plan of care was discussed with the physician assistant and modifications were made to the notation where appropriate.   Differential diagnosis and plan of care discussed with patient after the evaluation  50 minutes spent on total encounter of which more than fifty percent of the encounter was spent counseling and/or coordinating care by the attending physician.  Advanced care planning was discussed with patient and family.  Advanced care planning forms were reviewed and discussed.  Risks, benefits and alternatives of gastroenterologic procedures were discussed in detail and all questions were answered.

## 2023-09-21 NOTE — DISCHARGE NOTE PROVIDER - PROVIDER TOKENS
PROVIDER:[TOKEN:[[id number]],FOLLOWUP:[2 weeks],ESTABLISHEDPATIENT:[T]],PROVIDER:[TOKEN:[[id number]],FOLLOWUP:[2 weeks],ESTABLISHEDPATIENT:[T]] PROVIDER:[TOKEN:[[id number]],FOLLOWUP:[2 weeks],ESTABLISHEDPATIENT:[T]],PROVIDER:[TOKEN:[[id number]],FOLLOWUP:[2 weeks],ESTABLISHEDPATIENT:[T]] PROVIDER:[TOKEN:[[id number]],FOLLOWUP:[2 weeks],ESTABLISHEDPATIENT:[T]],PROVIDER:[TOKEN:[[id number]],FOLLOWUP:[2 weeks],ESTABLISHEDPATIENT:[T]] PROVIDER:[TOKEN:[[id number]],FOLLOWUP:[2 weeks],ESTABLISHEDPATIENT:[T]],PROVIDER:[TOKEN:[[id number]],FOLLOWUP:[2 weeks],ESTABLISHEDPATIENT:[T]],PROVIDER:[TOKEN:[[id number]],FOLLOWUP:[2 weeks],ESTABLISHEDPATIENT:[T]] PROVIDER:[TOKEN:[[id number]],FOLLOWUP:[2 weeks],ESTABLISHEDPATIENT:[T]],PROVIDER:[TOKEN:[[id number]],FOLLOWUP:[2 weeks],ESTABLISHEDPATIENT:[T]],PROVIDER:[TOKEN:[[id number]],FOLLOWUP:[2 weeks],ESTABLISHEDPATIENT:[T]] PROVIDER:[TOKEN:[[id number]],FOLLOWUP:[2 weeks],ESTABLISHEDPATIENT:[T]],PROVIDER:[TOKEN:[[id number]],FOLLOWUP:[2 weeks],ESTABLISHEDPATIENT:[T]],PROVIDER:[TOKEN:[[id number]],FOLLOWUP:[2 weeks],ESTABLISHEDPATIENT:[T]]

## 2023-09-21 NOTE — DISCHARGE NOTE PROVIDER - NSFOLLOWUPCLINICSTOKEN_GEN_ALL_ED_FT
700349:2 weeks|| ||00\01||False; 412909:2 weeks|| ||00\01||False; 680500:2 weeks|| ||00\01||False; 480552:1 month|| ||00\01||False; 664795:1 month|| ||00\01||False; 953474:1 month|| ||00\01||False;

## 2023-09-21 NOTE — DISCHARGE NOTE PROVIDER - NSDCFUADDAPPT_GEN_ALL_CORE_FT
APPTS ARE READY TO BE MADE: [x] YES    Best Family or Patient Contact (if needed):    Additional Information about above appointments (if needed):    1:   2:   3:     Other comments or requests:    APPTS ARE READY TO BE MADE: [x] YES    Best Family or Patient Contact (if needed):    Additional Information about above appointments (if needed):    1: Please f/u with neurology for spinal stenosis  2:   3:     Other comments or requests:    APPTS ARE READY TO BE MADE: [x] YES    Best Family or Patient Contact (if needed):    Additional Information about above appointments (if needed):    1: Please f/u with neurology for spinal stenosis  2:   3:     Other comments or requests:   Patient/Caregiver was provided with follow up request details and prefers to call the PCP office on their own to schedule.  APPTS ARE READY TO BE MADE: [x] YES    Best Family or Patient Contact (if needed):    Additional Information about above appointments (if needed):    1: Please f/u with neurology for spinal stenosis  2:   3:     Other comments or requests:   Patient/Caregiver was provided with follow up request details and prefers to call the PCP office on their own to schedule.   Patient was scheduled with Arturo Joyce on 11/21 at 10am at [address] [place] [address].  APPTS ARE READY TO BE MADE: [x] YES    Best Family or Patient Contact (if needed):    Additional Information about above appointments (if needed):    1: Please f/u with neurology for spinal stenosis  2:   3:     Other comments or requests:   Patient/Caregiver was provided with follow up request details and prefers to call the PCP office on their own to schedule.   Patient was scheduled with Arturo Joyce on 11/21 at 10am at [address] [place] [address].  APPTS ARE READY TO BE MADE: [x] YES    Best Family or Patient Contact (if needed):    Additional Information about above appointments (if needed):    1: Please f/u with neurology for spinal stenosis  2:   3:     Other comments or requests:   Patient/Caregiver was provided with follow up request details and prefers to call the PCP office on their own to schedule.   Patient was scheduled with Arturo Joyce on 11/21 at 10am at [address] [place] [address].  APPTS ARE READY TO BE MADE: [x] YES    Best Family or Patient Contact (if needed):    Additional Information about above appointments (if needed):    1: Please f/u with neurology for spinal stenosis  2:   3:     Other comments or requests:   Patient/Caregiver was provided with follow up request details and prefers to call the PCP office on their own to schedule.   Patient was scheduled with Arturo Joyce on 11/21 at 10am at [address] [place] [address].   Patient was scheduled for an appointment on 09/28 1:15p at [facility] [place] [address] with Dr. Pryor. Patient/Caregiver was advised of appointment details.    Patient was scheduled for an appointment on 09/21 12:00p at [address] [place] [address] with Dr. Mosquera. Patient/Caregiver was advised of appointment details.   APPTS ARE READY TO BE MADE: [x] YES    Best Family or Patient Contact (if needed):    Additional Information about above appointments (if needed):    1: Please f/u with neurology for spinal stenosis  2:   3:     Other comments or requests:   Patient/Caregiver was provided with follow up request details and prefers to call the PCP office on their own to schedule.   Patient was scheduled with Arturo Joyce on 11/21 at 10am at [address] [place] [address].   Patient was scheduled for an appointment on 09/28 1:15p at [facility] [place] [address] with Dr. Pryor. Patient/Caregiver was advised of appointment details.    Patient was scheduled for an appointment on 09/21 12:00p at [address] [place] [address] with Dr. Mosquera. Patient/Caregiver was advised of appointment details.   APPTS ARE READY TO BE MADE: [x] YES    Best Family or Patient Contact (if needed):    Additional Information about above appointments (if needed):    1: Please f/u with neurology for spinal stenosis  2:   3:     Other comments or requests:   Patient/Caregiver was provided with follow up request details and prefers to call the PCP office on their own to schedule.   Patient was scheduled with Arturo Joyce on 11/21 at 10am at [address] [place] [address].   Patient was scheduled for an appointment on 09/28 1:15p at [facility] [place] [address] with Dr. Pryor. Patient/Caregiver was advised of appointment details.    Patient was scheduled for an appointment on 09/21 12:00p at [address] [place] [address] with Dr. Mosquera. Patient/Caregiver was advised of appointment details.

## 2023-09-21 NOTE — PROGRESS NOTE ADULT - PROVIDER SPECIALTY LIST ADULT
Cardiology
Cardiology
Internal Medicine
Internal Medicine
Cardiology
Gastroenterology
Gastroenterology
Cardiology
Internal Medicine
Gastroenterology
Internal Medicine
Internal Medicine
Cardiology
Internal Medicine
Internal Medicine
Gastroenterology
Cardiology
Gastroenterology

## 2023-09-21 NOTE — PROGRESS NOTE ADULT - SUBJECTIVE AND OBJECTIVE BOX
Patient is a 78y old  Female who presents with a chief complaint of CP. CHEATHAM (21 Sep 2023 12:47)      SUBJECTIVE / OVERNIGHT EVENTS: Comfortable without new complaints.   Review of Systems  chest pain no  palpitations no  sob no  nausea no  headache no    MEDICATIONS  (STANDING):  ascorbic acid 500 milliGRAM(s) Oral daily  aspirin enteric coated 81 milliGRAM(s) Oral daily  atorvastatin 80 milliGRAM(s) Oral at bedtime  chlorhexidine 2% Cloths 1 Application(s) Topical <User Schedule>  dextrose 5%. 1000 milliLiter(s) (50 mL/Hr) IV Continuous <Continuous>  dextrose 5%. 1000 milliLiter(s) (100 mL/Hr) IV Continuous <Continuous>  dextrose 50% Injectable 25 Gram(s) IV Push once  dextrose 50% Injectable 25 Gram(s) IV Push once  dextrose 50% Injectable 12.5 Gram(s) IV Push once  ferrous    sulfate 325 milliGRAM(s) Oral daily  glucagon  Injectable 1 milliGRAM(s) IntraMuscular once  heparin   Injectable 5000 Unit(s) SubCutaneous every 12 hours  hydrochlorothiazide 12.5 milliGRAM(s) Oral daily  influenza  Vaccine (HIGH DOSE) 0.7 milliLiter(s) IntraMuscular once  insulin lispro (ADMELOG) corrective regimen sliding scale   SubCutaneous at bedtime  insulin lispro (ADMELOG) corrective regimen sliding scale   SubCutaneous three times a day before meals  losartan 100 milliGRAM(s) Oral daily  multivitamin 1 Tablet(s) Oral daily  pantoprazole    Tablet 40 milliGRAM(s) Oral before breakfast  senna 2 Tablet(s) Oral at bedtime    MEDICATIONS  (PRN):  acetaminophen     Tablet .. 650 milliGRAM(s) Oral every 6 hours PRN Temp greater or equal to 38C (100.4F), Mild Pain (1 - 3)  aluminum hydroxide/magnesium hydroxide/simethicone Suspension 30 milliLiter(s) Oral every 4 hours PRN Dyspepsia  dextrose Oral Gel 15 Gram(s) Oral once PRN Blood Glucose LESS THAN 70 milliGRAM(s)/deciliter  melatonin 3 milliGRAM(s) Oral at bedtime PRN Insomnia  ondansetron Injectable 4 milliGRAM(s) IV Push every 8 hours PRN Nausea and/or Vomiting      Vital Signs Last 24 Hrs  T(C): 36.4 (21 Sep 2023 16:40), Max: 36.9 (21 Sep 2023 00:17)  T(F): 97.6 (21 Sep 2023 16:40), Max: 98.4 (21 Sep 2023 00:17)  HR: 63 (21 Sep 2023 16:40) (58 - 66)  BP: 144/62 (21 Sep 2023 16:40) (125/65 - 144/62)  BP(mean): --  RR: 18 (21 Sep 2023 16:40) (18 - 18)  SpO2: 98% (21 Sep 2023 16:40) (93% - 98%)    Parameters below as of 21 Sep 2023 16:40  Patient On (Oxygen Delivery Method): room air        PHYSICAL EXAM:  GENERAL: NAD, well-developed  HEAD:  Atraumatic, Normocephalic  EYES: EOMI, PERRLA, conjunctiva and sclera clear  NECK: Supple, No JVD  CHEST/LUNG: Clear to auscultation bilaterally; No wheeze  HEART: Regular rate and rhythm; No murmurs, rubs, or gallops  ABDOMEN: Soft, Nontender, Nondistended; Bowel sounds present  EXTREMITIES:  2+ Peripheral Pulses, No clubbing, cyanosis, or edema  PSYCH: AAOx3  NEUROLOGY: non-focal  SKIN: No rashes or lesions    LABS:                        8.3    5.86  )-----------( 169      ( 21 Sep 2023 06:43 )             27.4     09-21    135  |  102  |  32<H>  ----------------------------<  98  3.6   |  21<L>  |  1.31<H>    Ca    11.1<H>      21 Sep 2023 06:41  Phos  3.5     09-21  Mg     1.9     09-21            Urinalysis Basic - ( 21 Sep 2023 06:41 )    Color: x / Appearance: x / SG: x / pH: x  Gluc: 98 mg/dL / Ketone: x  / Bili: x / Urobili: x   Blood: x / Protein: x / Nitrite: x   Leuk Esterase: x / RBC: x / WBC x   Sq Epi: x / Non Sq Epi: x / Bacteria: x          RADIOLOGY & ADDITIONAL TESTS:    Imaging Personally Reviewed:    Consultant(s) Notes Reviewed:      Care Discussed with Consultants/Other Providers:

## 2023-09-21 NOTE — DISCHARGE NOTE PROVIDER - NSDCMRMEDTOKEN_GEN_ALL_CORE_FT
Aspir 81 oral delayed release tablet: 1 orally once a day  losartan 100 mg oral tablet: 1 orally once a day  metFORMIN 500 mg oral tablet: 1 orally 2 times a day  naproxen 500 mg oral tablet: 1 orally 2 times a day  omeprazole 40 mg oral delayed release capsule: 1 orally once a day  rosuvastatin 40 mg oral tablet: 1 orally once a day (at bedtime)   ferrous sulfate 325 mg (65 mg elemental iron) oral tablet: 1 tab(s) orally once a day  hydroCHLOROthiazide 12.5 mg oral capsule: 1 cap(s) orally once a day  losartan 100 mg oral tablet: 1 orally once a day  metFORMIN 500 mg oral tablet: 1 orally 2 times a day  naproxen 500 mg oral tablet: 1 orally 2 times a day  omeprazole 40 mg oral delayed release capsule: 1 orally once a day  rosuvastatin 40 mg oral tablet: 1 orally once a day (at bedtime)

## 2023-09-21 NOTE — PROGRESS NOTE ADULT - SUBJECTIVE AND OBJECTIVE BOX
[facility] Coverage    CARDIOLOGY FOLLOW UP - Dr. Ayala  Date of Service: 9/21/23  CC: no events    Review of Systems:  Constitutional: No fever, weight loss, or fatigue  Respiratory: No cough, wheezing, or hemoptysis, no shortness of breath  Cardiovascular: No chest pain, palpitations, passing out, dizziness, or leg swelling  Gastrointestinal: No abd or epigastric pain. No nausea, vomiting, or hematemesis; no diarrhea or consiptaiton, no melena or hematochezia  Vascular: No edema     TELEMETRY:    PHYSICAL EXAM:  T(C): 36.7 (09-21-23 @ 11:28), Max: 36.9 (09-21-23 @ 00:17)  HR: 58 (09-21-23 @ 11:28) (58 - 66)  BP: 125/65 (09-21-23 @ 11:28) (110/64 - 143/69)  RR: 18 (09-21-23 @ 11:28) (18 - 18)  SpO2: 98% (09-21-23 @ 11:28) (93% - 98%)  Wt(kg): --  I&O's Summary    20 Sep 2023 07:01  -  21 Sep 2023 07:00  --------------------------------------------------------  IN: 480 mL / OUT: 0 mL / NET: 480 mL        Appearance: Normal	  Cardiovascular: Normal S1 S2,RRR, No JVD, No murmurs  Respiratory: Lungs clear to auscultation	  Gastrointestinal:  Soft, Non-tender, + BS	  Extremities: Normal range of motion, No clubbing, cyanosis or edema  Vascular: Peripheral pulses palpable 2+ bilaterally       Home Medications:  Aspir 81 oral delayed release tablet: 1 orally once a day (14 Sep 2023 03:53)  losartan 100 mg oral tablet: 1 orally once a day (14 Sep 2023 03:53)  metFORMIN 500 mg oral tablet: 1 orally 2 times a day (14 Sep 2023 03:53)  naproxen 500 mg oral tablet: 1 orally 2 times a day (14 Sep 2023 03:53)  omeprazole 40 mg oral delayed release capsule: 1 orally once a day (14 Sep 2023 03:53)  rosuvastatin 40 mg oral tablet: 1 orally once a day (at bedtime) (14 Sep 2023 03:53)        MEDICATIONS  (STANDING):  ascorbic acid 500 milliGRAM(s) Oral daily  aspirin enteric coated 81 milliGRAM(s) Oral daily  atorvastatin 80 milliGRAM(s) Oral at bedtime  chlorhexidine 2% Cloths 1 Application(s) Topical <User Schedule>  dextrose 5%. 1000 milliLiter(s) (50 mL/Hr) IV Continuous <Continuous>  dextrose 5%. 1000 milliLiter(s) (100 mL/Hr) IV Continuous <Continuous>  dextrose 50% Injectable 25 Gram(s) IV Push once  dextrose 50% Injectable 25 Gram(s) IV Push once  dextrose 50% Injectable 12.5 Gram(s) IV Push once  ferrous    sulfate 325 milliGRAM(s) Oral daily  glucagon  Injectable 1 milliGRAM(s) IntraMuscular once  heparin   Injectable 5000 Unit(s) SubCutaneous every 12 hours  hydrochlorothiazide 12.5 milliGRAM(s) Oral daily  influenza  Vaccine (HIGH DOSE) 0.7 milliLiter(s) IntraMuscular once  insulin lispro (ADMELOG) corrective regimen sliding scale   SubCutaneous three times a day before meals  insulin lispro (ADMELOG) corrective regimen sliding scale   SubCutaneous at bedtime  losartan 100 milliGRAM(s) Oral daily  multivitamin 1 Tablet(s) Oral daily  pantoprazole    Tablet 40 milliGRAM(s) Oral before breakfast  senna 2 Tablet(s) Oral at bedtime        EKG:  RADIOLOGY:  DIAGNOSTIC TESTING:  [ ] Echocardiogram:  [ ] Catherterization:  [ ] Stress Test:  OTHER:     LABS:	 	                          8.3    5.86  )-----------( 169      ( 21 Sep 2023 06:43 )             27.4     09-21    135  |  102  |  32<H>  ----------------------------<  98  3.6   |  21<L>  |  1.31<H>    Ca    11.1<H>      21 Sep 2023 06:41  Phos  3.5     09-21  Mg     1.9     09-21            CARDIAC MARKERS:

## 2023-09-21 NOTE — DISCHARGE NOTE NURSING/CASE MANAGEMENT/SOCIAL WORK - PATIENT PORTAL LINK FT
You can access the FollowMyHealth Patient Portal offered by [facility] by registering at the following website: [email or web address]. By joining [facility]’s FollowMyHealth portal, you will also be able to view your health information using other applications (apps) compatible with our system. You can access the FollowMyHealth Patient Portal offered by [facility] by registering at the following website: [email or web address]. By joining [facility]’s FollowMyHealth portal, you will also be able to view your health information using other applications (apps) compatible with our system. You can access the FollowMyHealth Patient Portal offered by [facility] by registering at the following website: [email or web address]. By joining [employer]’s FollowMyHealth portal, you will also be able to view your health information using other applications (apps) compatible with our system.

## 2023-09-21 NOTE — DISCHARGE NOTE PROVIDER - NSDCCPCAREPLAN_GEN_ALL_CORE_FT
PRINCIPAL DISCHARGE DIAGNOSIS  Diagnosis: Chest pain  Assessment and Plan of Treatment: You were admitted for chest pain, we did a heart catheterization which showed non-obstructive CAD. Please follow up with your PCP in 1-2 weeks      SECONDARY DISCHARGE DIAGNOSES  Diagnosis: HLD (hyperlipidemia)  Assessment and Plan of Treatment: Continue your rosuvastatin    Diagnosis: DM2 (diabetes mellitus, type 2)  Assessment and Plan of Treatment: Continue your metformin    Diagnosis: HTN (hypertension)  Assessment and Plan of Treatment: Continue your losartan    Diagnosis: Iron deficiency anemia  Assessment and Plan of Treatment: Please take iron pills as directed. You received one unit of blood for low blood levels during your admission. We found you to have low iron levels, please follow up with GI outpatient for possible colonoscopy.     PRINCIPAL DISCHARGE DIAGNOSIS  Diagnosis: Chest pain  Assessment and Plan of Treatment: You were admitted for chest pain, we did a heart catheterization which showed non-obstructive CAD. Please follow up with your PCP in 1-2 weeks      SECONDARY DISCHARGE DIAGNOSES  Diagnosis: HLD (hyperlipidemia)  Assessment and Plan of Treatment: Continue your rosuvastatin    Diagnosis: DM2 (diabetes mellitus, type 2)  Assessment and Plan of Treatment: Continue your metformin    Diagnosis: HTN (hypertension)  Assessment and Plan of Treatment: Continue your losartan. Please take hydrochlorothiazide 12.5mg once daily. See Dr. Pryor within 2 weeks.    Diagnosis: Iron deficiency anemia  Assessment and Plan of Treatment: Please take iron pills as directed. You received one unit of blood for low blood levels during your admission. We found you to have low iron levels, please follow up with GI outpatient for possible colonoscopy.

## 2023-09-21 NOTE — PROGRESS NOTE ADULT - SUBJECTIVE AND OBJECTIVE BOX
INTERVAL HPI/OVERNIGHT EVENTS:  pt seen and examined No new overnight event.    hgb stable, no bleeding     Allergies    No Known Allergies    Intolerances          General:  No wt loss, fevers, chills, night sweats, fatigue,   Eyes:  Good vision, no reported pain  ENT:  No sore throat, pain, runny nose, dysphagia  CV:  No pain, palpitations, hypo/hypertension  Resp:  No dyspnea, cough, tachypnea, wheezing  GI:  No pain, No nausea, No vomiting, No diarrhea, No constipation, No weight loss, No fever, No pruritis, No rectal bleeding, No tarry stools, No dysphagia,  :  No pain, bleeding, incontinence, nocturia  Muscle:  No pain, weakness  Neuro:  No weakness, tingling, memory problems  Psych:  No fatigue, insomnia, mood problems, depression  Endocrine:  No polyuria, polydipsia, cold/heat intolerance  Heme:  No petechiae, ecchymosis, easy bruisability  Skin:  No rash, tattoos, scars, edema      PHYSICAL EXAM:   Vital Signs Last 24 Hrs  T(C): 36.8 (21 Sep 2023 04:23), Max: 36.9 (21 Sep 2023 00:17)  T(F): 98.3 (21 Sep 2023 04:23), Max: 98.4 (21 Sep 2023 00:17)  HR: 65 (21 Sep 2023 04:23) (63 - 66)  BP: 140/67 (21 Sep 2023 04:23) (110/64 - 143/69)  BP(mean): --  RR: 18 (21 Sep 2023 04:23) (18 - 18)  SpO2: 96% (21 Sep 2023 04:23) (93% - 97%)    Parameters below as of 21 Sep 2023 04:23  Patient On (Oxygen Delivery Method): room air    Daily     Daily  I&O's Detail    20 Sep 2023 07:01  -  21 Sep 2023 07:00  --------------------------------------------------------  IN:    Oral Fluid: 480 mL  Total IN: 480 mL    OUT:  Total OUT: 0 mL    Total NET: 480 mL      GENERAL:  Appears stated age, well-groomed, well-nourished, no distress  HEENT:  NC/AT,  conjunctivae clear and pink, no thyromegaly, nodules, adenopathy, no JVD, sclera -anicteric  CHEST:  Full & symmetric excursion, no increased effort, breath sounds clear  HEART:  Regular rhythm, S1, S2, no murmur/rub/S3/S4, no abdominal bruit, no edema  ABDOMEN:  Soft, non-tender, non-distended, normoactive bowel sounds,  no masses ,no hepato-splenomegaly, no signs of chronic liver disease  EXTEREMITIES:  no cyanosis,clubbing or edema  SKIN:  No rash/erythema/ecchymoses/petechiae/wounds/abscess/warm/dry  NEURO:  Alert, oriented, no asterixis, no tremor, no encephalopathy      LABS:                        8.3    5.86  )-----------( 169      ( 21 Sep 2023 06:43 )             27.4   09-21    135  |  102  |  32<H>  ----------------------------<  98  3.6   |  21<L>  |  1.31<H>    Ca    11.1<H>      21 Sep 2023 06:41  Phos  3.5     09-21  Mg     1.9     09-21          Urinalysis Basic - ( 17 Sep 2023 07:10 )    Color: x / Appearance: x / SG: x / pH: x  Gluc: 107 mg/dL / Ketone: x  / Bili: x / Urobili: x   Blood: x / Protein: x / Nitrite: x   Leuk Esterase: x / RBC: x / WBC x   Sq Epi: x / Non Sq Epi: x / Bacteria: x      amylase   lipase  RADIOLOGY & ADDITIONAL TESTS:

## 2023-09-21 NOTE — DISCHARGE NOTE PROVIDER - HOSPITAL COURSE
HPI:  78y F pmh htn, hld, dm, p/w  2-3 days of substernal non- radiating CP, SOB, and progressive CHEATHAM x past few weeks. Now only able to tolerate 10-15 feet before becoming winded. No orthopnea. Also report worsening b/l LE pitting edema. Was eval by PCP who was concerned w test results and referred pt to Cardio-- Dr. Beny Garber,  who sent Pt  to ED for likely stress test/echo, +/- cath.     ROS: Denies palpitation, N/V/D, fever, cough, chills, dizziness, abd pain, recent travel, sick contact, change in bowel and urinary habits     A 10-system ROS was performed and is negative except as noted above and/or in the HPI.   (14 Sep 2023 03:45)    Hospital Course:  78y F pmh htn, hld, dm, p/w  2-3 days of substernal non- radiating CP, SOB, and progressive CHEATHAM x past few weeks admitted for CP w/u  Pt admitted w Angina pectoris. Afebrile, VSS, clinically nontoxic, EKG: NSR, CXR neg, TTE EF 69%. 9/19 had diagnostic LHC via failed RRA and RFA - non obstructive CAD  Pt also with anemia 2/2 iron deficiency, guaiac negative, pt received 1 unit prbc and started on supplemental iron.  Pt had elevated DVT, CTA neg for PE, LE duplex neg for DVT.      HTN (hypertension).   - losartan 100mg   - Monitor.    HLD (hyperlipidemia).   - c/w statin.    DM2 (diabetes mellitus, type 2).   - Hold home Metformin   - ISS ACHS  - Check FS, adjust insulin accordingly   - DASH/CC diet  - Check A1c.    Back pain/ Leg pain  - MRI LS noted  - Spine service evaluation    Important Medication Changes and Reason:    Active or Pending Issues Requiring Follow-up:    Advanced Directives:   [ ] Full code  [ ] DNR  [ ] Hospice    Discharge Diagnoses:         HPI:  78y F pmh htn, hld, dm, p/w  2-3 days of substernal non- radiating CP, SOB, and progressive CHEATHAM x past few weeks. Now only able to tolerate 10-15 feet before becoming winded. No orthopnea. Also report worsening b/l LE pitting edema. Was eval by PCP who was concerned w test results and referred pt to Cardio-- Dr. Beny Garber,  who sent Pt  to ED for likely stress test/echo, +/- cath.     ROS: Denies palpitation, N/V/D, fever, cough, chills, dizziness, abd pain, recent travel, sick contact, change in bowel and urinary habits     A 10-system ROS was performed and is negative except as noted above and/or in the HPI.   (14 Sep 2023 03:45)    Hospital Course:  78y F pmh htn, hld, dm, p/w  2-3 days of substernal non- radiating CP, SOB, and progressive CHEATHAM x past few weeks admitted for CP w/u  Pt admitted w Angina pectoris. Afebrile, VSS, clinically nontoxic, EKG: NSR, CXR neg, TTE EF 69%. 9/19 had diagnostic LHC via failed RRA and RFA - non obstructive CAD  Pt also with anemia 2/2 iron deficiency, guaiac negative, pt received 1 unit prbc and started on supplemental iron.  Pt had elevated DVT, CTA neg for PE, LE duplex neg for DVT.  For HTN, pt on losartan 100mg   Back pain/ Leg pain, MRI LS noted    Important Medication Changes and Reason: HCTZ, ferrous sulfate    Active or Pending Issues Requiring Follow-up: f/u GI outpatient     Advanced Directives:   [x] Full code  [ ] DNR  [ ] Hospice    Discharge Diagnoses: angina pectoris, iron def anemia         HPI:  78y F pmh htn, hld, dm, p/w  2-3 days of substernal non- radiating CP, SOB, and progressive CHEATHAM x past few weeks. Now only able to tolerate 10-15 feet before becoming winded. No orthopnea. Also report worsening b/l LE pitting edema. Was eval by PCP who was concerned w test results and referred pt to Cardio-- Dr. Beny Pryor,  who sent Pt  to ED for likely stress test/echo, +/- cath.     ROS: Denies palpitation, N/V/D, fever, cough, chills, dizziness, abd pain, recent travel, sick contact, change in bowel and urinary habits     A 10-system ROS was performed and is negative except as noted above and/or in the HPI.   (14 Sep 2023 03:45)    Hospital Course:  78y F pmh htn, hld, dm, p/w  2-3 days of substernal non- radiating CP, SOB, and progressive CHEATHAM x past few weeks admitted for CP w/u  Pt admitted w Angina pectoris. Afebrile, VSS, clinically nontoxic, EKG: NSR, CXR neg, TTE EF 69%. 9/19 had diagnostic LHC via failed RRA and RFA - non obstructive CAD  Pt also with anemia 2/2 iron deficiency, guaiac negative, pt received 1 unit prbc and started on supplemental iron.  Pt had elevated DVT, CTA neg for PE, LE duplex neg for DVT.  For HTN, pt on losartan 100mg   Back pain/ Leg pain, MRI LS noted    Important Medication Changes and Reason: HCTZ, ferrous sulfate    Active or Pending Issues Requiring Follow-up: f/u GI outpatient for scope    Advanced Directives:   [x] Full code  [ ] DNR  [ ] Hospice    Discharge Diagnoses: angina pectoris, iron def anemia

## 2023-09-21 NOTE — PROGRESS NOTE ADULT - ASSESSMENT
78y F pmh htn, hld, dm, p/w  2-3 days of substernal non- radiating CP, SOB, and progressive CHEATHAM x past few weeks admitted for CP w/u    Angina pectoris.   - Afebrile, VSS, clinically nontoxic   - EKG: NSR   - CXR neg   - Telemetry   - Echo noted  - Cath no obstructive disease  - Cardio follow Beny Pryor    Anemia iron deficiency  - GI follow  - Guaiac negative  - supplement iron   - s/p transfusion 1 PRBC  - EGD/ Colon as OTP     D Dimer elevated  - CTa no embolus  - LED no DVT    HTN (hypertension).   - losartan 100mg   - Monitor.    HLD (hyperlipidemia).   - c/w statin.    DM2 (diabetes mellitus, type 2).   - Hold home Metformin   - ISS ACHS  - Check FS, adjust insulin accordingly   - DASH/CC diet    Back pain/ Leg pain  - MRI LS noted  - Spine service evaluation pending     Prophylactic measure.   - DVT ppx: Heparin sq  - GI ppx: PPI   - Diet: DASH/ CC.    DCP home. Follow with PMD/ Gastroenterology/ Spine service in 3-4 days. QA     d/w patient and ACP    Brock Randolph MD phone [phone]  78y F pmh htn, hld, dm, p/w  2-3 days of substernal non- radiating CP, SOB, and progressive CHEATHAM x past few weeks admitted for CP w/u    Angina pectoris.   - Afebrile, VSS, clinically nontoxic   - EKG: NSR   - CXR neg   - Telemetry   - Echo noted  - Cath no obstructive disease  - Cardio follow Beny Pryor    Anemia iron deficiency  - GI follow  - Guaiac negative  - supplement iron   - s/p transfusion 1 PRBC  - EGD/ Colon as OTP     D Dimer elevated  - CTa no embolus  - LED no DVT    HTN (hypertension).   - losartan 100mg   - Monitor.    HLD (hyperlipidemia).   - c/w statin.    DM2 (diabetes mellitus, type 2).   - Hold home Metformin   - ISS ACHS  - Check FS, adjust insulin accordingly   - DASH/CC diet    Back pain/ Leg pain  - MRI LS noted  - Spine service evaluation pending     Prophylactic measure.   - DVT ppx: Heparin sq  - GI ppx: PPI   - Diet: DASH/ CC.    DCP home. Follow with PMD/ Gastroenterology/ Spine service in 3-4 days. QA     d/w patient and ACP    Brock Randolph MD phone [phone]  78y F pmh htn, hld, dm, p/w  2-3 days of substernal non- radiating CP, SOB, and progressive CHEATHAM x past few weeks admitted for CP w/u    Angina pectoris.   - Afebrile, VSS, clinically nontoxic   - EKG: NSR   - CXR neg   - Telemetry   - Echo noted  - Cath no obstructive disease  - Cardio follow Beny Pryor    Anemia iron deficiency  - GI follow  - Guaiac negative  - supplement iron   - s/p transfusion 1 PRBC  - EGD/ Colon as OTP     D Dimer elevated  - CTa no embolus  - LED no DVT    HTN (hypertension).   - losartan 100mg   - Monitor.    HLD (hyperlipidemia).   - c/w statin.    DM2 (diabetes mellitus, type 2).   - Hold home Metformin   - ISS ACHS  - Check FS, adjust insulin accordingly   - DASH/CC diet    Back pain/ Leg pain  - MRI LS noted  - Spine service evaluation pending     Prophylactic measure.   - DVT ppx: Heparin sq  - GI ppx: PPI   - Diet: DASH/ CC.    DCP home. Follow with PMD/ Gastroenterology/ Spine service in 3-4 days. QA     d/w patient and ACP    Brock Randolph MD phone [phone]

## 2023-09-21 NOTE — DISCHARGE NOTE PROVIDER - NSFOLLOWUPCLINICS_GEN_ALL_ED_FT
[facility]  Gastroenterology  [address] [place] [address]  Phone: [phone]  Fax:   Follow Up Time: 2 weeks     Gastroenterology at [facility]  Gastroenterology  [address] [place] [address]  Phone: [phone]  Fax:   Follow Up Time: 2 weeks     Gastroenterology at [facility]  Gastroenterology  [address] [place] [address]  Phone: [phone]  Fax:   Follow Up Time: 2 weeks     [facility]  Neurology  [address] [place] [address] [address]rd [place] [address]  Phone: [phone]  Fax:   Follow Up Time: 1 month     [facility]  Neurology  [address] [place] [address] [address]rd [place] [address]  Phone: [phone]  Fax:   Follow Up Time: 1 month     [facility]  Neurology  [address] [place] [address] 3rd [place] [address]  Phone: [phone]  Fax:   Follow Up Time: 1 month

## 2023-09-21 NOTE — DISCHARGE NOTE PROVIDER - NPI NUMBER (FOR SYSADMIN USE ONLY) :
[[id number] [[id number] [[id number]],[[id number]] [[id number]],[[id number]],[[id number]] [[id number]],[[id number]],[[id number]] [[id number]],[[id number]],[[id number]]

## 2023-09-21 NOTE — DISCHARGE NOTE PROVIDER - CARE PROVIDER_API CALL
Cm Rodriguez  Internal Medicine  [address] [place] [address]  Phone: [phone]  Fax: [phone]  Established Patient  Follow Up Time: 2 weeks    Beny Pryor  Cardiovascular Disease  [address] [place] [address]  Phone: [phone]  Fax: [phone]  Established Patient  Follow Up Time: 2 weeks   Cm Rodriguez  Internal Medicine  [address] [place] [address]  Phone: [phone]  Fax: [phone]  Established Patient  Follow Up Time: 2 weeks    Beny Pryor  Cardiovascular Disease  [address] [place] [address]  Phone: [phone]  Fax: [phone]  Established Patient  Follow Up Time: 2 weeks   Cm Rodriguez  Internal Medicine  [address] [place] [address]  Phone: [phone]  Fax: [phone]  Established Patient  Follow Up Time: 2 weeks    Beny Pryor  Cardiovascular Disease  [address] [place] [address]  Phone: [phone]  Fax: [phone]  Established Patient  Follow Up Time: 2 weeks   Cm Rodriguez  Internal Medicine  [address] [email or web address] [place] [address]  Phone: [phone]  Fax: [phone]  Established Patient  Follow Up Time: 2 weeks    Beny Pryor  Cardiovascular Disease  [address], [place] [address]  Phone: [phone]  Fax: [phone]  Established Patient  Follow Up Time: 2 weeks    Murphy Mosquera  Gastroenterology  [address] [place] [address]  Phone: [phone]  Fax: [phone]  Established Patient  Follow Up Time: 2 weeks   Cm Rodriguez  Internal Medicine  [address] [place] [address]  Phone: [phone]  Fax: [phone]  Established Patient  Follow Up Time: 2 weeks    Beny Pryor  Cardiovascular Disease  [address] [address]  Phone: [phone]  Fax: [phone]  Established Patient  Follow Up Time: 2 weeks    Murphy Mosquera  Gastroenterology  [address] [place] [address]  Phone: [phone]  Fax: [phone]  Established Patient  Follow Up Time: 2 weeks   Cm Rodriguez  Internal Medicine  [address] [place] [address]  Phone: [phone]  Fax: [phone]  Established Patient  Follow Up Time: 2 weeks    Beny Pryor  Cardiovascular Disease  [address] [address]  Phone: [phone]  Fax: [phone]  Established Patient  Follow Up Time: 2 weeks    Murphy Mosquera  Gastroenterology  [address] [place] [address]  Phone: [phone]  Fax: [phone]  Established Patient  Follow Up Time: 2 weeks

## 2023-09-21 NOTE — PROGRESS NOTE ADULT - REASON FOR ADMISSION
LO CHEATHAM

## 2023-09-21 NOTE — DISCHARGE NOTE NURSING/CASE MANAGEMENT/SOCIAL WORK - NSDCPEFALRISK_GEN_ALL_CORE
For information on Fall & Injury Prevention, visit: [email or web address]-[email or web address]-mobility OR  [email or web address]://[email or web address]/[email or web address]-prevention-tips-[email or web address]-injury OR  https://www.cdc.gov/steadi/patient.html For information on Fall & Injury Prevention, visit: [email or web address]://www.[facility]/news/fall-prevention-protects-and-maintains-health-and-mobility OR  https://www.[facility]/news/fall-prevention-tips-to-avoid-injury OR  https://www.cdc.gov/steadi/patient.html For information on Fall & Injury Prevention, visit: https://www.[facility]/news/fall-prevention-protects-[email or web address]-maintains-health-and-mobility OR  https://www.[facility]/news/fall-prevention-tips-to-avoid-injury OR  https://www.cdc.gov/steadi/patient.html

## 2023-09-21 NOTE — PROGRESS NOTE ADULT - ASSESSMENT
78y F pmh htn, hld, dm, p/w  2-3 days of substernal non- radiating CP, SOB, and progressive CHEATHAM x past few weeks    Recs:  cardiac stable  no e/o acs by ecg or biomarkers  elevated d-dimer --> CTA chest neg for PE and lower ext venous duplex neg for DVT  s/p cardiac cath --> non obs cad  vol status acceptabe, cxr clear, bnp level normal for age = cw hctz 12.5mg for improved bp control and e/o elevated LA-P on echo  s/p echo = normal lv/rv fxn, + diastolic dysfunction  anemia workup, fobt neg, gi following    dcp    35 minutes spent on total encounter; more than 50% of the visit was spent counseling and/or coordinating care by the attending physician.

## 2023-09-21 NOTE — PROGRESS NOTE ADULT - NUTRITIONAL ASSESSMENT
This patient has been assessed with a concern for Malnutrition and has been determined to have a diagnosis/diagnoses of Morbid obesity (BMI > 40).    This patient is being managed with:   Diet DASH/TLC-  Sodium & Cholesterol Restricted  Consistent Carbohydrate {Evening Snack} (CSTCHOSN)  Abdelrahman(7 Gm Arginine/7 Gm Glut/1.2 Gm HMB     Qty per Day:  2  Supplement Feeding Modality:  Oral  Glucerna Shake Cans or Servings Per Day:  1       Frequency:  Daily  Entered: Sep 15 2023  5:26PM  

## 2023-11-13 PROCEDURE — 82272 OCCULT BLD FECES 1-3 TESTS: CPT

## 2023-11-13 PROCEDURE — 82553 CREATINE MB FRACTION: CPT

## 2023-11-13 PROCEDURE — 71275 CT ANGIOGRAPHY CHEST: CPT

## 2023-11-13 PROCEDURE — 86850 RBC ANTIBODY SCREEN: CPT

## 2023-11-13 PROCEDURE — 82550 ASSAY OF CK (CPK): CPT

## 2023-11-13 PROCEDURE — 72148 MRI LUMBAR SPINE W/O DYE: CPT

## 2023-11-13 PROCEDURE — 80061 LIPID PANEL: CPT

## 2023-11-13 PROCEDURE — 93454 CORONARY ARTERY ANGIO S&I: CPT

## 2023-11-13 PROCEDURE — C8929: CPT

## 2023-11-13 PROCEDURE — 87641 MR-STAPH DNA AMP PROBE: CPT

## 2023-11-13 PROCEDURE — 85027 COMPLETE CBC AUTOMATED: CPT

## 2023-11-13 PROCEDURE — 71045 X-RAY EXAM CHEST 1 VIEW: CPT

## 2023-11-13 PROCEDURE — 83036 HEMOGLOBIN GLYCOSYLATED A1C: CPT

## 2023-11-13 PROCEDURE — P9016: CPT

## 2023-11-13 PROCEDURE — 86803 HEPATITIS C AB TEST: CPT

## 2023-11-13 PROCEDURE — 82962 GLUCOSE BLOOD TEST: CPT

## 2023-11-13 PROCEDURE — 84466 ASSAY OF TRANSFERRIN: CPT

## 2023-11-13 PROCEDURE — 85045 AUTOMATED RETICULOCYTE COUNT: CPT

## 2023-11-13 PROCEDURE — 84100 ASSAY OF PHOSPHORUS: CPT

## 2023-11-13 PROCEDURE — 83880 ASSAY OF NATRIURETIC PEPTIDE: CPT

## 2023-11-13 PROCEDURE — 73502 X-RAY EXAM HIP UNI 2-3 VIEWS: CPT

## 2023-11-13 PROCEDURE — 93970 EXTREMITY STUDY: CPT

## 2023-11-13 PROCEDURE — 80048 BASIC METABOLIC PNL TOTAL CA: CPT

## 2023-11-13 PROCEDURE — 87640 STAPH A DNA AMP PROBE: CPT

## 2023-11-13 PROCEDURE — 84484 ASSAY OF TROPONIN QUANT: CPT

## 2023-11-13 PROCEDURE — 93005 ELECTROCARDIOGRAM TRACING: CPT

## 2023-11-13 PROCEDURE — 86900 BLOOD TYPING SEROLOGIC ABO: CPT

## 2023-11-13 PROCEDURE — 83540 ASSAY OF IRON: CPT

## 2023-11-13 PROCEDURE — 36430 TRANSFUSION BLD/BLD COMPNT: CPT

## 2023-11-13 PROCEDURE — 85610 PROTHROMBIN TIME: CPT

## 2023-11-13 PROCEDURE — 83550 IRON BINDING TEST: CPT

## 2023-11-13 PROCEDURE — 85379 FIBRIN DEGRADATION QUANT: CPT

## 2023-11-13 PROCEDURE — 80053 COMPREHEN METABOLIC PANEL: CPT

## 2023-11-13 PROCEDURE — 99285 EMERGENCY DEPT VISIT HI MDM: CPT

## 2023-11-13 PROCEDURE — 83735 ASSAY OF MAGNESIUM: CPT

## 2023-11-13 PROCEDURE — 86923 COMPATIBILITY TEST ELECTRIC: CPT

## 2023-11-13 PROCEDURE — C1887: CPT

## 2023-11-13 PROCEDURE — 84443 ASSAY THYROID STIM HORMONE: CPT

## 2023-11-13 PROCEDURE — 85025 COMPLETE CBC W/AUTO DIFF WBC: CPT

## 2023-11-13 PROCEDURE — 82728 ASSAY OF FERRITIN: CPT

## 2023-11-13 PROCEDURE — 36415 COLL VENOUS BLD VENIPUNCTURE: CPT

## 2023-11-13 PROCEDURE — C1894: CPT

## 2023-11-13 PROCEDURE — 82746 ASSAY OF FOLIC ACID SERUM: CPT

## 2023-11-13 PROCEDURE — 86901 BLOOD TYPING SEROLOGIC RH(D): CPT

## 2023-11-13 PROCEDURE — C1769: CPT

## 2023-12-19 RX ORDER — LOSARTAN POTASSIUM 100 MG/1
1 TABLET, FILM COATED ORAL
Refills: 0 | DISCHARGE

## 2023-12-19 RX ORDER — ASPIRIN/CALCIUM CARB/MAGNESIUM 324 MG
1 TABLET ORAL
Refills: 0 | DISCHARGE

## 2023-12-19 RX ORDER — METFORMIN HYDROCHLORIDE 850 MG/1
1 TABLET ORAL
Refills: 0 | DISCHARGE

## 2023-12-19 RX ORDER — OMEPRAZOLE 10 MG/1
1 CAPSULE, DELAYED RELEASE ORAL
Refills: 0 | DISCHARGE

## 2023-12-19 RX ORDER — ROSUVASTATIN CALCIUM 5 MG/1
1 TABLET ORAL
Refills: 0 | DISCHARGE

## 2024-02-26 ENCOUNTER — APPOINTMENT (OUTPATIENT)
Dept: NEUROLOGY | Facility: CLINIC | Age: 79
End: 2024-02-26

## 2024-08-16 ENCOUNTER — APPOINTMENT (OUTPATIENT)
Dept: ULTRASOUND IMAGING | Facility: IMAGING CENTER | Age: 79
End: 2024-08-16
Payer: MEDICARE

## 2024-08-16 ENCOUNTER — APPOINTMENT (OUTPATIENT)
Dept: CT IMAGING | Facility: IMAGING CENTER | Age: 79
End: 2024-08-16
Payer: MEDICARE

## 2024-08-16 PROCEDURE — 71275 CT ANGIOGRAPHY CHEST: CPT | Mod: 26

## 2024-08-16 PROCEDURE — 76700 US EXAM ABDOM COMPLETE: CPT | Mod: 26

## 2024-09-17 ENCOUNTER — RESULT REVIEW (OUTPATIENT)
Age: 79
End: 2024-09-17

## 2024-09-21 ENCOUNTER — TRANSCRIPTION ENCOUNTER (OUTPATIENT)
Age: 79
End: 2024-09-21

## 2025-02-04 ENCOUNTER — OUTPATIENT (OUTPATIENT)
Dept: OUTPATIENT SERVICES | Facility: HOSPITAL | Age: 80
LOS: 1 days | Discharge: HOME | End: 2025-02-04
Payer: MEDICARE

## 2025-02-04 DIAGNOSIS — N17.9 ACUTE KIDNEY FAILURE, UNSPECIFIED: ICD-10-CM

## 2025-02-04 DIAGNOSIS — D63.1 ANEMIA IN CHRONIC KIDNEY DISEASE: ICD-10-CM

## 2025-02-04 DIAGNOSIS — E78.5 HYPERLIPIDEMIA, UNSPECIFIED: ICD-10-CM

## 2025-02-04 DIAGNOSIS — N25.81 SECONDARY HYPERPARATHYROIDISM OF RENAL ORIGIN: ICD-10-CM

## 2025-02-04 DIAGNOSIS — E83.52 HYPERCALCEMIA: ICD-10-CM
